# Patient Record
Sex: MALE
[De-identification: names, ages, dates, MRNs, and addresses within clinical notes are randomized per-mention and may not be internally consistent; named-entity substitution may affect disease eponyms.]

---

## 2019-09-06 LAB
BASOPHILS # BLD AUTO: 0.1 10*3/UL (ref 0–0.1)
BASOPHILS NFR BLD AUTO: 1.5 % (ref 0–1)
DEPRECATED NEUTROPHILS # BLD AUTO: 3 10*3/UL (ref 2.1–6.9)
EOSINOPHIL # BLD AUTO: 0.2 10*3/UL (ref 0–0.4)
EOSINOPHIL NFR BLD AUTO: 3.1 % (ref 0–6)
ERYTHROCYTE [DISTWIDTH] IN CORD BLOOD: 13.4 % (ref 11.7–14.4)
HCT VFR BLD AUTO: 40 % (ref 38.2–49.6)
HGB BLD-MCNC: 12.8 G/DL (ref 14–18)
LYMPHOCYTES # BLD: 2.2 10*3/UL (ref 1–3.2)
LYMPHOCYTES NFR BLD AUTO: 35.4 % (ref 18–39.1)
MCH RBC QN AUTO: 31.1 PG (ref 28–32)
MCHC RBC AUTO-ENTMCNC: 32 G/DL (ref 31–35)
MCV RBC AUTO: 97.3 FL (ref 81–99)
MONOCYTES # BLD AUTO: 0.6 10*3/UL (ref 0.2–0.8)
MONOCYTES NFR BLD AUTO: 10.4 % (ref 4.4–11.3)
NEUTS SEG NFR BLD AUTO: 49.3 % (ref 38.7–80)
PLATELET # BLD AUTO: 170 X10E3/UL (ref 140–360)
RBC # BLD AUTO: 4.11 X10E6/UL (ref 4.3–5.7)

## 2019-09-10 ENCOUNTER — HOSPITAL ENCOUNTER (OUTPATIENT)
Dept: HOSPITAL 88 - OR | Age: 69
Discharge: HOME | End: 2019-09-10
Attending: INTERNAL MEDICINE
Payer: MEDICARE

## 2019-09-10 VITALS — SYSTOLIC BLOOD PRESSURE: 111 MMHG | DIASTOLIC BLOOD PRESSURE: 76 MMHG

## 2019-09-10 DIAGNOSIS — K21.0: ICD-10-CM

## 2019-09-10 DIAGNOSIS — K44.9: ICD-10-CM

## 2019-09-10 DIAGNOSIS — G47.30: ICD-10-CM

## 2019-09-10 DIAGNOSIS — K22.70: Primary | ICD-10-CM

## 2019-09-10 DIAGNOSIS — I48.91: ICD-10-CM

## 2019-09-10 DIAGNOSIS — Z79.02: ICD-10-CM

## 2019-09-10 DIAGNOSIS — Z98.84: ICD-10-CM

## 2019-09-10 DIAGNOSIS — Z87.891: ICD-10-CM

## 2019-09-10 DIAGNOSIS — Z01.812: ICD-10-CM

## 2019-09-10 DIAGNOSIS — E66.3: ICD-10-CM

## 2019-09-10 DIAGNOSIS — M19.90: ICD-10-CM

## 2019-09-10 DIAGNOSIS — Z71.3: ICD-10-CM

## 2019-09-10 DIAGNOSIS — E78.00: ICD-10-CM

## 2019-09-10 DIAGNOSIS — Z01.810: ICD-10-CM

## 2019-09-10 PROCEDURE — 36415 COLL VENOUS BLD VENIPUNCTURE: CPT

## 2019-09-10 PROCEDURE — 45378 DIAGNOSTIC COLONOSCOPY: CPT

## 2019-09-10 PROCEDURE — 93005 ELECTROCARDIOGRAM TRACING: CPT

## 2019-09-10 PROCEDURE — 85025 COMPLETE CBC W/AUTO DIFF WBC: CPT

## 2019-09-10 PROCEDURE — 88305 TISSUE EXAM BY PATHOLOGIST: CPT

## 2019-09-10 PROCEDURE — 43239 EGD BIOPSY SINGLE/MULTIPLE: CPT

## 2019-09-10 NOTE — XMS REPORT
Summary of Care: 18 - 18

                             Created on: 2061



RUY CHILDRESS MYRNA

External Reference #: [id number]

: 1950

Sex: Male



Demographics







                          Address                   [address] [place] [address] [place]  [address]

 

                          Home Phone                [phone]

 

                          Preferred Language        English

 

                          Marital Status            

 

                          Shinto Affiliation     Gnosticism

 

                          Race                      White

 

                                        Additional Race(s)  

 

                          Ethnic Group              Non-





Author







                          Author                    [facility]

 

                          Organization              [facility]

 

                          Address                   Unknown

 

                          Phone                     Unavailable







Care Team Providers







                    Care Team Member Name    Role                Phone

 

                    Bettina Leroy    PCP                 [phone]







Encounter





HQ Encntr_alias(FIN) [id number] Date(s): 18 - 18

[facility] [address] [facility] [address] [place] [address]-  
  [phone]

Discharge Disposition: Home or Self Care

Attending Physician: Kuldeep Mckenzie MD

Referring Physician: Bettina Rueda DO





Vital Signs





No data available for this section



Problem List







    



              Condition     Effective Dates     Status       Health Status     Informant

 

    



                           Atrial                    Active  



                                         fibrillation(Confirm    



                                         ed)    

 

    



                           Tanner's                 Active  



                                         esophagus(Confirmed)    

 

    



                           Hard of                   Active  



                                         hearing(Confirmed)    

 

    



                           Sleep                     Active  



                                         apnea(Confirmed)    







Allergies, Adverse Reactions, Alerts





No Known Medication Allergies





   



                 Substance       Reaction        Severity        Status

 

   



                           NKFA                      Active







Medications





No data available for this section



Results





No data available for this section



Immunizations





No data available for this section



Procedures







    



              Procedure     Date         Related Diagnosis     Body Site     Status

 

    



                     Laparoscopic repair of inguinal hernia     18            Completed

 

    



                     Gastric bypass                      Completed

 

    



                     Operation                           Completed

 

    



                     Implantation of electronic stimulator of                     Completed



                                         spine    

 

    



                     TKR -Total prosthetic replacement of knee                     Completed



                                         joint using cement    

 

    



                           Esophagogastroduodenoscopy        Completed







Social History







 



                           Social History Type       Response

 

 



                           Substance Abuse           Use: None.

 

 



                           Sexual                    Sexually active: No.  Testicular Self Exam No.

 

 



                           Employment/School         Status: Retired.

 

 



                           Alcohol                   Never

 

 



                           Smoking Status            Current every day smoker; Type: Cigarettes; Concerns about tobacco

 use in



                                         household: Yes; Lives with someone who smokes; Cigarette Smoking Last 365



                                         Days Yes; Reg Smoking Cessation Counseling No



                                         entered on: 19







Assessment and Plan





No data available for this section

## 2019-09-10 NOTE — XMS REPORT
Summary of Care: 19 - 19

                             Created on: 2020



RUY CHILDRESS MYRNA

External Reference #: [id number]

: 1950

Sex: Male



Demographics







                          Address                   [address] [place] [address] [place]  [address]-

 

                          Home Phone                [phone]

 

                          Preferred Language        English

 

                          Marital Status            

 

                          Christianity Affiliation     Judaism

 

                          Race                      White

 

                                        Additional Race(s)  

 

                          Ethnic Group              Non-





Author







                          Author                    [facility]

 

                          Organization              [facility]

 

                          Address                   Unknown

 

                          Phone                     Unavailable







Care Team Providers







                    Care Team Member Name    Role                Phone

 

                    Bettina Leroy    PCP                 [phone]







Encounter





HQ Encntr_alias(FIN) [id number] Date(s): 19 - 19

[facility] [address] [place] [address]-     [phone]
[phone]

Discharge Disposition: Home or Self Care

Attending Physician: Dilshad Schuster MD

Referring Physician: Bettina Rueda DO





Vital Signs





No data available for this section



Problem List







    



              Condition     Effective Dates     Status       Health Status     Informant

 

    



                           Atrial                    Active  



                                         fibrillation(Confirm    



                                         ed)    

 

    



                           Tanner's                 Active  



                                         esophagus(Confirmed)    

 

    



                           Hard of                   Active  



                                         hearing(Confirmed)    

 

    



                           Sleep                     Active  



                                         apnea(Confirmed)    







Allergies, Adverse Reactions, Alerts





No Known Medication Allergies





   



                 Substance       Reaction        Severity        Status

 

   



                           NKFA                      Active







Medications





No data available for this section



Results





No data available for this section



Immunizations





No data available for this section



Procedures







    



              Procedure     Date         Related Diagnosis     Body Site     Status

 

    



                     Laparoscopic repair of inguinal hernia     18            Completed

 

    



                     Gastric bypass                      Completed

 

    



                     Operation                           Completed

 

    



                     Implantation of electronic stimulator of                     Completed



                                         spine    

 

    



                     TKR -Total prosthetic replacement of knee                     Completed



                                         joint using cement    

 

    



                           Esophagogastroduodenoscopy        Completed







Social History







 



                           Social History Type       Response

 

 



                           Substance Abuse           Use: None.

 

 



                           Sexual                    Sexually active: No.  Testicular Self Exam No.

 

 



                           Employment/School         Status: Retired.

 

 



                           Alcohol                   Never

 

 



                           Smoking Status            Current every day smoker; Type: Cigarettes; Concerns about tobacco

 use in



                                         household: Yes; Lives with someone who smokes; Cigarette Smoking Last 365



                                         Days Yes; Reg Smoking Cessation Counseling No



                                         entered on: 19







Assessment and Plan





No data available for this section

## 2019-09-10 NOTE — XMS REPORT
[facility]

                             Created on: 2018



Agustin Morton

External Reference #: [id number]

: 1950

Sex: Male



Demographics







                          Address                   [address] [place] [address] [place]  [address]

 

                          Home Phone                [phone]

 

                          Preferred Language        Unknown

 

                          Marital Status            Unknown

 

                          Gnosticist Affiliation     Unknown

 

                          Race                      Unknown

 

                          Ethnic Group              UNK





Author







                          Author                    Bettina Leroy

 

                          [facility]              [employer]

 

                          Address                   Unknown

 

                          Phone                     Unavailable







Care Team Providers







                    Care Team Member Name    Role                Phone

 

                    Bettina Leroy    CP                  Unavailable



                                                                



Allergies, Adverse Reactions, Alerts

          





                    Substance           Reaction            Event Type

 

                    N.K.D.A.            Info Not Available    Non Drug Allergy



                                                                                
       



Problems

          





             Problem Type    Condition    Code         Onset Dates    Condition Status

 

             Problem      Diverticulosis of colon    K57.30                    Active

 

             Problem      Hearing loss    H91.90                    Active

 

             Problem      Depressive disorder, not elsewhere classified    F32.9                     Active

 

             Problem      Prediabetes    R73.09                    Active

 

             Problem      A-fib        I48.91                    Active

 

                Problem         Benign prostatic hyperplasia with lower urinary tract symptoms    N40.1           

                                        Active

 

             Problem      Insomnia     G47.00                    Active

 

             Problem      Obesity      E66.9                     Active

 

             Problem      Mixed hyperlipidemia    E78.2                     Active

 

             Problem      VALERIE (obstructive sleep apnea)    G47.33                    Active

 

                          Assessment                Unilateral inguinal hernia without obstruction or gangrene, recurrence

 not specified      K40.90                                  Active

 

             Assessment    Feeling of incomplete bladder emptying    R39.14                    Active

 

             Problem      Tanner's esophagus    K22.70                    Active

 

                    Assessment          Benign prostatic hyperplasia with lower urinary tract symptoms    N40.1

                                                    Active

 

             Problem      Essential hypertension    I10                       Active



                                                                                
                                                                                
                                                                  



Medications

          





        Medication    Code System    Code    Instructions    Start Date    End Date    Status    Dosage



 

        Multi For Him 50+    NDC     22961588285     Orally                     Active    not defined

 

        Omeprazole    NDC     27394379074    20 MG Orally Once a day                    Active    1 capsule

 

        Xarelto    NDC     53098401977    20 MG Orally Once a day                    Active    1 tablet with 

food

 

        Zoloft    NDC     91463694192    25 MG Orally Once a day                    Active    1 tablet

 

        Restasis    NDC     64985389858    0.05 % Ophthalmic Twice a day                    Active    1  into

 affected eye

 

          Trazodone HCl    NDC       49652486774    150 MG Orally Once a day    May 17, 2013              Active

                                        1/2 tablet at bedtime

 

        Sotalol HCl    NDC     61736575050    80 MG Orally every 12 hrs                    Active    1 tablet



 

        Carvedilol    NDC     72590717292    6.25 MG Orally Twice a day                    Active    1 tablet

 with food

 

        Lipitor    NDC     87848454328    10 MG Orally Once a day                    Active    1 tablet

 

        Lexapro    NDC     59326625043    10 mg Orally Once a day                    Active    1 tablet



                                                                                
                                                                                
                          



Vital Signs

          





                          Date/Time:                2018

 

                          BMI                       28.81 Index

 

                          Weight                    206.6 lbs

 

                          Height                    71 in

 

                          Cardiac Monitoring Heart Rate    55 /min

 

                          Blood Pressure Diastolic    68 mm Hg

 

                          Blood Pressure Systolic    118 mm Hg



                                                                              



Results

          No Known Results                                                      
             



Summary Purpose

          [employer] Submission

## 2019-09-10 NOTE — XMS REPORT
[facility]

                             Created on: 2017



Agustin Morton

External Reference #: [id number]

: 1950

Sex: Male



Demographics







                          Address                   [address] [place] [address] [place]  [address]

 

                          Home Phone                [phone]

 

                          Preferred Language        Unknown

 

                          Marital Status            Unknown

 

                          Buddhist Affiliation     Unknown

 

                          Race                      Unknown

 

                          Ethnic Group              Non-





Author







                          Author                    Bettina Leroy

 

                          [facility]              [employer]

 

                          Address                   Unknown

 

                          Phone                     Unavailable







Care Team Providers







                    Care Team Member Name    Role                Phone

 

                    Bettina Leroy                      Unavailable



                                            



Encounters

          





                    Encounter           Location            Date

 

                    CPAP FOLLOW UP      [facility]    2015

 

                          CUB Notes for CPAP machine    [facility]

                                        2015

 

                          New Appointment Request    [facility]

                                        2015

 

                    req bone density    [facility]    Aug

 07, 2015

 

                    Physical FBW        [facility]    May 21,

 2014

 

                    REFERRAL            [facility]    2016



 

                    F/U BW              [facility]    2014



 

                    New Refill Request    [facility]    2017

 

                    Night 2 Sleep Study    [facility]    

2015

 

                    physical exam       [facility]    Nov 10,

 2015

 

                    FOLLOW UP           [facility]    Oct 12, 2015



 

                          New Appointment Request    [facility]

                                        Oct 13, 2015



                                                                                
                                                                                
                                    



Problems

          





             Problem Type    Condition    ICD-9 Code    Onset Dates    Condition Status

 

             Problem      Essential hypertension    I10                       Active

 

             Problem      Obesity      E66.9                     Active

 

             Problem      Tanner's esophagus    K22.70                    Active

 

             Problem      Diverticulosis of colon    K57.30                    Active

 

             Problem      Prediabetes    R73.09                    Active

 

             Problem      Mixed hyperlipidemia    E78.2                     Active

 

             Problem      A-fib        I48.91                    Active

 

             Problem      Depressive disorder, not elsewhere classified    F32.9                     Active

 

             Problem      Hearing loss    H91.90                    Active

 

             Problem      VALERIE (obstructive sleep apnea)    G47.33                    Active

 

             Problem      Insomnia     G47.00                    Active



                                                                                
                                                                                
                          



Social History

          





                    Social History Element    Qualifiers          Date Reported

 

                    Last Colonoscopy:    .  2009

 

                    children            .  5                2016

 

                    Tobacco Use:        .  Are you a: never smoker    2016

 

                    Use of recreational / street drugs?    .  Answer: No       2016

 

                    Ethnicity           .  Status , Is english your primary language? Yes    2016



 

                    Do you have pets?    .  Status: Yes, Type: dog(s)    2016

 

                    Marital Status:     .  Misty    2016

 

                    Caffeine intake?    .  Status: Yes, What type: Coffee, 1 cup a day    2016

 

                    Do you exercise?    .  Answer: Yes      2016

 

                    Do you drink alcohol?    .  Status: No       2016

 

                    Occupation:         .  , retired    2016



                                                                                
                                                                                
      



Summary Purpose

          [employer] Submission

## 2019-09-10 NOTE — XMS REPORT
Summary of Care: 18 - 18

                             Created on: 2071



RUY CHILDRESS

External Reference #: [id number]

: 1950

Sex: Male



Demographics







                          Address                   [address] [place] [address] [place]  [address]-

 

                          Home Phone                [phone]

 

                          Preferred Language        English

 

                          Marital Status            

 

                          Taoist Affiliation     Yarsani

 

                          Race                      White

 

                                        Additional Race(s)  

 

                          Ethnic Group              Non-





Author







                          Author                    [facility]

 

                          Organization              [facility]

 

                          Address                   Unknown

 

                          Phone                     Unavailable







Care Team Providers







                    Care Team Member Name    Role                Phone

 

                    Bettina Leroy    PCP                 [phone]







Encounter





HQ Encntr_alias(FIN) [id number] Date(s): 18 - 18

[facility] [address] [place] [address]-     2
[phone]

Discharge Disposition: Home or Self Care

Attending Physician: Dilshad Schuster MD

Referring Physician: Bettina Rueda DO





Vital Signs





No data available for this section



Problem List







    



              Condition     Effective Dates     Status       Health Status     Informant

 

    



                           Atrial                    Active  



                                         fibrillation(Confirm    



                                         ed)    

 

    



                           Tanner's                 Active  



                                         esophagus(Confirmed)    

 

    



                           Hard of                   Active  



                                         hearing(Confirmed)    

 

    



                           Sleep                     Active  



                                         apnea(Confirmed)    







Allergies, Adverse Reactions, Alerts





No Known Medication Allergies





   



                 Substance       Reaction        Severity        Status

 

   



                           NKFA                      Active







Medications





No data available for this section



Results





No data available for this section



Immunizations





No data available for this section



Procedures







    



              Procedure     Date         Related Diagnosis     Body Site     Status

 

    



                     Laparoscopic repair of inguinal hernia     18            Completed

 

    



                     Complex uroflowmetry (eg, calibrated     18             Completed



                                         electronic equipment)    

 

    



                     Measurement of post-voiding residual urine     18             Completed



                                         and/or bladder capacity by ultrasound,    



                                         non-imaging    

 

    



                     Gastric bypass                      Completed

 

    



                     Operation                           Completed

 

    



                     Implantation of electronic stimulator of                     Completed



                                         spine    

 

    



                     TKR -Total prosthetic replacement of knee                     Completed



                                         joint using cement    

 

    



                           Esophagogastroduodenoscopy        Completed







Social History







 



                           Social History Type       Response

 

 



                           Substance Abuse           Use: None.

 

 



                           Sexual                    Sexually active: No.  Testicular Self Exam No.

 

 



                           Employment/School         Status: Retired.

 

 



                           Alcohol                   Never

 

 



                           Smoking Status            Current every day smoker; Type: Cigarettes; Concerns about tobacco

 use in



                                         household: Yes; Lives with someone who smokes; Cigarette Smoking Last 365



                                         Days Yes; Reg Smoking Cessation Counseling No



                                         entered on: 19







Assessment and Plan





No data available for this section

## 2019-09-10 NOTE — OPERATIVE REPORT
DATE OF PROCEDURE:  

 

SURGEON:  Robert Beltre MD

 

NAME OF THE PROCEDURE:  EGD and colonoscopy.

 

PREPROCEDURE DIAGNOSES:  The patient with history of Tanner's and colon cancer

screening. 

 

DESCRIPTION OF PROCEDURE:  After informed and written consent, premedications with

monitored anesthesia care, standard video gastroscope was introduced into the mouth,

esophagus, and stomach into the second portion into the jejunum.  The gastrojejunal

anastomosis appeared to be normal.  The patient has had a gastric bypass.  A complete

Z-line was noted, indicative of a small hiatal hernia.  There was irregularity of the GE

junction and the Z-line, and biopsies were done to look for dysplasia in the setting of

short-segment Tanner's.  Distal esophagus was normal.  Next, adult video Olympus

colonoscope was introduced into the rectum and all the way into the terminal ileum.

Terminal ileum, cecum, ascending, transverse, descending, sigmoid, and rectum all

appeared to be normal. 

 

IMPRESSION:  Normal colonoscopy, hiatal hernia, history of Tanner's.

 

RECOMMENDATIONS:  EGD in 5 years.  Follow up in the office in 2 to 3 weeks.  GERD

precautions have been advised. 

 

 

 

 

______________________________

Robert Beltre MD

 

SR/MODL

D:  09/10/2019 08:07:18

T:  09/10/2019 14:37:38

Job #:  [id number]

 

cc:            Bettina Rueda DO

## 2019-09-10 NOTE — XMS REPORT
Summary of Care: 18 - 18

                             Created on: 2045



RUY CHILDRESS MYRNA

External Reference #: [id number]

: 1950

Sex: Male



Demographics







                          Address                   [address] [place]  [address]-

 

                          Home Phone                [phone]

 

                          Preferred Language        English

 

                          Marital Status            

 

                          Tenriism Affiliation     Mandaeism

 

                          Race                      White

 

                                        Additional Race(s)  

 

                          Ethnic Group              Non-





Author







                          Author                    [facility]

 

                          Organization              [facility]

 

                          Address                   Unknown

 

                          Phone                     Unavailable







Care Team Providers







                    Care Team Member Name    Role                Phone

 

                    Bettina Leroy    PCP                 [phone]







Encounter





HQ Chanel(FIN) [id number] Date(s): 18 - 18

[facility] [address] [place] [address]-     [phone]
[phone]

Encounter Diagnosis

Gastro-esophageal reflux disease without esophagitis (Final) - 

Hyperlipidemia, unspecified (Final) - 

Unspecified atrial fibrillation (Final) - 

Unspecified right bundle-branch block (Final) - 

Long term (current) use of anticoagulants (Final) - 

Bilateral inguinal hernia, without obstruction or gangrene, not specified as rec
urrent (Final) - 19

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 

Bariatric surgery status (Final) - 

Atherosclerotic heart disease of native coronary artery without angina pectoris 
(Final) - 

Discharge Disposition: Home or Self Care

Attending Physician: Kuldeep Mckenzie MD

Admitting Physician: Kuldeep Mckenzie MD

Referring Physician: Kuldeep Mckenzie MD





Vital Signs







                    1                   2                   3



                                         Most recent to   



                                         oldest [Reference   



                                         Range]:   

 

                                        180.34 cm 

                    (18 7:48 AM)                         



                                         Height   

 

                                        97.7 DegF 

                    (18 7:49 AM)                         



                                         Temperature Oral   



                                         [96.4-99.1 DegF]   

 

                                        102/57 mmHg 

                          (18 11:45 AM)       94/70 mmHg 

                          (18 11:30 AM)       93/66 mmHg 

                                        (18 11:15 AM)



                                         Blood Pressure   



                                         [/60-90 mmHg]   

 

                                        16 BRMIN 

                          (18 10:20 AM)       16 BRMIN 

                          (18 10:15 AM)       16 BRMIN 

                                        (18 10:00 AM)



                                         Respiratory Rate   



                                         [14-20 BRMIN]   

 

                                        58 bpm 

*LOW*

                    (18 7:49 AM)                         



                                         Peripheral Pulse   



                                         Rate [ bpm]   

 

                                        90.909 kg 

                    (18 7:48 AM)                         



                                         Weight   

 

                                        27.95 m2 

                    (18 7:48 AM)                         



                                         Body Mass Index   







Problem List







    



              Condition     Effective Dates     Status       Health Status     Informant

 

    



                           Atrial                    Active  



                                         fibrillation(Confirm    



                                         ed)    

 

    



                           Tanner's                 Active  



                                         esophagus(Confirmed)    

 

    



                           Hard of                   Active  



                                         hearing(Confirmed)    

 

    



                           Sleep                     Active  



                                         apnea(Confirmed)    







Allergies, Adverse Reactions, Alerts





No Known Medication Allergies





   



                 Substance       Reaction        Severity        Status

 

   



                           NKFA                      Active







Medications





Ancef + sterile water 20 mL

2 gm, Route: IVP, ONCE, Dosing Weight 90.909, kg, Start date: 18 8:17:00 C
ST, Stop date: 18 8:17:00 CST, Surgical Prophylaxis Only; For patients < 
120 kg, ABX Indication: Surgical Prophylaxis

Notes: (Same As: Ancef, Kefzol)  *** MEDICATION WASTE ***Product Size:  1000 mgP
roduct Wasted:  ___ mg

Start Date: 18

Stop Date: 1/10/19

Status: Discontinued



ceFAZolin (ANES)

Route: IV, Drug form: INJ, ONCE, Stop date: 18 9:13:00 CST

Start Date: 18

Stop Date: 18

Status: Completed



Colace 100 mg oral capsule

100 mg=1 cap, PO, BID, PRN Constipation, # 20 cap, 0 Refill(s)

Start Date: 18

Status: Ordered



dexamethasone (ANES)

Route: IV, Drug form: INJ, ONCE, Stop date: 18 9:43:00 CST

Start Date: 18

Stop Date: 18

Status: Completed



ePHEDrine (ANES)

Route: IV, Drug form: INJ, ONCE, Stop date: 18 9:43:00 CST

Start Date: 18

Stop Date: 18

Status: Completed



fentaNYL (ANES)

Route: IV, Drug form: INJ, ONCE, Stop date: 18 9:13:00 CST

Start Date: 18

Stop Date: 18

Status: Completed



glycopyrrolate (ANES)

Route: IV, Drug form: INJ, ONCE, Stop date: 18 9:43:00 CST

Start Date: 18

Stop Date: 18

Status: Completed



Hair, Skin & Nails

5 mg=, PO, Daily, 0 Refill(s)

Start Date: 18

Status: Ordered



Lactated Ringers Injection IV (ANES) 1000 mL

Route: IV, Total Volume: 1,000, Start date: 18 8:23:00 CST, Stop date:  9:23:00 CST

Start Date: 18

Stop Date: 18

Status: Completed



Lactated Ringers Injection IV 1000 mL

1,000 mL, Rate: 25 ml/hr, Infuse over: 40 hr, Route: IV, Dosing Weight 90.909 kg
, Total Volume: 1,000, Start date: 18 8:15:00 CST, Duration: 30 day, Stop 
date: 19 8:14:00 CST, 2.15, m2

Start Date: 18

Stop Date: 18

Status: Discontinued



lidocaine (ANES)

Route: IV, Drug form: INJ, ONCE, Stop date: 18 9:13:00 CST

Start Date: 18

Stop Date: 18

Status: Completed



midazolam (ANES)

Route: IV, Drug form: SOLN, ONCE, Stop date: 18 9:13:00 CST

Start Date: 18

Stop Date: 18

Status: Completed



neostigmine (ANES)

Route: IV, Drug form: INJ, ONCE, Stop date: 18 9:43:00 CST

Start Date: 18

Stop Date: 18

Status: Completed



ondansetron (ANES)

Route: IV, Drug form: INJ, ONCE, Stop date: 18 9:43:00 CST

Start Date: 18

Stop Date: 18

Status: Completed



propofol (ANES)

Route: IV, Drug form: INJ, ONCE, Stop date: 18 9:13:00 CST

Start Date: 18

Stop Date: 18

Status: Completed



rocuronium (ANES)

Route: IV, Drug form: INJ, ONCE, Stop date: 18 9:13:00 CST

Start Date: 18

Stop Date: 18

Status: Completed



ropivacaine 0.2% in NS - site 1 400 mL

Dosing: Per Nerve Block Dosing Order, Route: NERVE BLOCK, Start date: 18 9
:17:00  mL, Drug Form: INJ, Dosing Weight 90.909, kg, Duration: 30 day, S
top date: 19 9:16:00 CST

Notes: Final concentration:  Ropivacaine 0.2% 400 ml

Start Date: 18

Stop Date: 18

Status: Discontinued



ropivacaine 0.2% in NS - site 1 400 mL

Dosing: Per Nerve Block Dosing Order, Route: NERVE BLOCK, Start date: 18 9
:16:00  mL, Dosing Weight 90.909, kg, Duration: 30 day, Stop date:  9:15:00 CST

Start Date: 18

Stop Date: 18

Status: Deleted



ropivacaine 0.2% in NS - site 2 400 mL

Dosing: Per Nerve Block Dosing Order, Route: NERVE BLOCK, Start date: 18 9
:17:00  mL, Drug Form: INJ, Dosing Weight 90.909, kg, Duration: 30 day, S
top date: 19 9:16:00 CST

Notes: Final concentration:  Ropivacaine 0.2% 400 ml

Start Date: 18

Stop Date: 18

Status: Discontinued



ropivacaine 0.2% in NS - site 2 400 mL

Dosing: Per Nerve Block Dosing Order, Route: NERVE BLOCK, Start date: 18 9
:16:00  mL, Dosing Weight 90.909, kg, Duration: 30 day, Stop date:  9:15:00 CST

Start Date: 18

Stop Date: 18

Status: Deleted



Tylenol with Codeine #3 oral tablet

1 tab, PO, Q6H, PRN pain, X 7 day, # 28 tab, 0 Refill(s)

Start Date: 18

Stop Date: 1/7/19

Status: Completed



Results











 



                           Most recent to            1



                                         oldest [Reference 



                                         Range]: 

 

 



                           Neutrophils #             3.2 K/CMM



                           [1.5-8.1 K/CMM]           (18 8:19 AM)

 

 



                           Lymphocytes #             2.0 K/CMM



                           [1.0-5.5 K/CMM]           (18 8:19 AM)

 

 



                           Monocytes # [0.0-0.8      0.6 K/CMM



                           K/CMM]                    (18 8: AM)

 

 



                           Eosinophils #             0.2 K/CMM



                           [0.0-0.5 K/CMM]           (18 8: AM)

 

 



                           Basophils # [0.0-0.2      0.1 K/CMM



                           K/CMM]                    (18 8: AM)

 

 



                           eGFR                      88 mL/min/1.73m2 1



                                         *NA*



                                         (18 AM)

 

 



                           A/G Ratio [0.7-1.6]       1.0



                                         (18: AM)

 

 



                           Albumin Lvl [3.5-5.0      3.5 g/dL



                           g/dL]                     (18 8: AM)

 

 



                           Alk Phos [          68 unit/L



                           unit/L]                   (18 AM)

 

 



                           ALT [0-65 unit/L]         44 unit/L



                                         (18 AM)

 

 



                           AGAP [10.0-20.0           10.2 mEq/L



                           mEq/L]                    (18 AM)

 

 



                           AST [0-37 unit/L]         30 unit/L



                                         (18: AM)

 

 



                           B/C Ratio [6-25]          24



                                         (18 8: AM)

 

 



                           Basophils [0.0-1.0        0.9 %



                           %]                        (18 AM)

 

 



                           BUN [7-22 mg/dL]          20 mg/dL



                                         (18 8 AM)

 

 



                           Calcium Lvl               8.5 mg/dL



                           [8.5-10.5 mg/dL]          (18 8: AM)

 

 



                           Chloride Lvl [      109 mEq/L



                           mEq/L]                    (18 AM)

 

 



                           CO2 [24-32 mEq/L]         28 mEq/L



                                         (18)

 

 



                           Creatinine Lvl            0.85 mg/dL



                           [0.50-1.40 mg/dL]         (18 AM)

 

 



                           Eosinophils [0.0-4.0      3.9 %



                           %]                        (18)

 

 



                           Globulin [2.7-4.2         3.5 g/dL



                           g/dL]                     (18)

 

 



                           Glucose Lvl [70-99        74 mg/dL



                           mg/dL]                    (18 AM)

 

 



                           Hct [42.0-54.0 %]         41.0 %



                                         *LOW*



                                         (18)

 

 



                           Hgb [14.0-18.0 g/dL]      13.9 g/dL



                                         *LOW*



                                         (18)

 

 



                           Potassium Lvl             4.2 mEq/L



                           [3.5-5.1 mEq/L]           (18)

 

 



                           Lymphocytes               32.7 %



                           [20.0-40.0 %]             (18)

 

 



                           MCH [27.0-31.0 pg]        32.4 pg



                                         *HI*



                                         (18)

 

 



                           MCHC [32.0-36.0           33.8 g/dL



                           g/dL]                     (18 AM)

 

 



                           MCV [80.0-94.0 fL]        95.9 fL



                                         *HI*



                                         (18)

 

 



                           Monocytes [2.0-12.0       10.2 %



                           %]                        (18)

 

 



                           MPV [7.4-10.4 fL]         8.4 fL



                                         (18 AM)

 

 



                           Sodium Lvl [135-145       143 mEq/L



                           mEq/L]                    (18)

 

 



                           Platelet [133-450         165 K/CMM



                           K/CMM]                    (18 AM)

 

 



                           Segs [45.0-75.0 %]        52.3 %



                                         (18)

 

 



                           Total Protein             7.0 g/dL



                           [6.4-8.4 g/dL]            (18 8:19 AM)

 

 



                           RBC [4.70-6.10            4.28 M/CMM



                           M/CMM]                    *LOW*



                                         (18 8:19 AM)

 

 



                           RDW [11.5-14.5 %]         13.6 %



                                         (18 8:19 AM)

 

 



                           Bili Total [0.2-1.3       0.7 mg/dL



                           mg/dL]                    (18 8:19 AM)

 

 



                           WBC [3.7-10.4 K/CMM]      6.1 K/CMM



                                         (18 8:19 AM)







1Result Comment: The eGFR is calculated using the CKD-EPI formula. In most 
young, healthy individuals the eGFR will be >90 mL/min/1.73m2. The eGFR declines
with age. An eGFR of 60-89 may be normal in some populations, particularly the 
elderly, for whom the CKD-EPI formula has not been extensively validated. Use of
the eGFR is not recommended in the following populations:



Individuals with unstable creatinine concentrations, including pregnant patients
and those with serious co-morbid conditions.



Patients with extremes in muscle mass or diet. 



The data above are obtained from the National Kidney Disease Education Program (
NKDEP) which additionally recommends that when the eGFR is used in patients with
extremes of body mass index for purposes of drug dosing, the eGFR should be mul
tiplied by the estimated BMI.



Immunizations





No data available for this section



Procedures







    



              Procedure     Date         Related Diagnosis     Body Site     Status

 

    



                     Laparoscopic repair of inguinal hernia     18            Completed

 

    



                     Gastric bypass                      Completed

 

    



                     Operation                           Completed

 

    



                     Implantation of electronic stimulator of                     Completed



                                         spine    

 

    



                     TKR -Total prosthetic replacement of knee                     Completed



                                         joint using cement    

 

    



                           Esophagogastroduodenoscopy        Completed







Social History







 



                           Social History Type       Response

 

 



                           Substance Abuse           Use: None.

 

 



                           Sexual                    Sexually active: No.  Testicular Self Exam No.

 

 



                           Employment/School         Status: Retired.

 

 



                           Alcohol                   Never

 

 



                           Smoking Status            Current every day smoker; Type: Cigarettes; Concerns about tobacco

 use in



                                         household: Yes; Lives with someone who smokes; Cigarette Smoking Last 365



                                         Days Yes; Reg Smoking Cessation Counseling No



                                         entered on: 19







Assessment and Plan

Extracted from:





  



                     Title: Clinical Document     Author: Kuldeep Mckenzie MD     Date: 18









                                        DATE OF OPERATION/PROCEDURE: 2018



*_*_*

PREOPERATIVE DIAGNOSIS:

Right inguinal hernia.



POSTOPERATIVE DIAGNOSES:

Bilateral inguinal hernia.



PROCEDURES PERFORMED:

                                        1.  Bilateral laparoscopic inguinal hernia repair with mesh.

                                        2.  Reduction of left-sided cord lipoma.

                                        3.  Placement of bilateral On-Q pain catheters.



SURGEON:

Kuldeep Mckenzie M.D.



ANESTHESIA:

General endotracheal.



WOUND CLASSIFICATION:

Clean.



OPERATIVE TIME:

                                        1 hour



ESTIMATED BLOOD LOSS:

Minimal.



FLUID REPLACEMENT:

Per anesthesia.



SPECIMENS SENT FOR PATHOLOGY:

None.



COMPLICATIONS:

None.



FINDINGS:

                                        1.  Right inguinal hernia containing hernia sac in the indirect space.

                                        2.  Left inguinal hernia containing cord lipoma in the indirect space.



CONDITION:

To recovery, stable.



POSTOPERATIVE PLAN:

Discharge to home.



INDICATIONS:

This is a 68-year-old gentleman that presented to my clinic with right groin 
pain.  His history and physical as well as laboratory studies were consistent 
with diagnosis of a right inguinal hernia.  Therefore, the option of a 
laparoscopic right versus bilateral inguinal hernia repair with mesh was offered
to the patient.  Risks and benefits of the procedure were explained and the 
patient voiced understanding and consented to the procedure.



OPERATIVE NARRATIVE:

Patient was taken to the operating room and placed on the operating table and 
intubated by anesthesia.  The abdomen, groin, testicles, and penis were prepped 
and draped in the usual sterile fashion.  A timeout was called and the correct 
patient, as well as procedure was verified.  The patient had SCDs on for 
thromboembolic prophylaxis and received antibiotics within 1 hour of the 
incision.



Attention was turned to the right infraumbilical region.  Marcaine 0.5% was 
infused followed by a 1-cm incision carried down using electrocautery and the 
fascia was identified, scored using a 15-blade and the rectus muscles swept 
laterally and the space maker plus device was placed in the preperitoneal space,
the preperitoneal space was dissected using 30 pumps of this space maker 
balloon. The space maker was removed and CO2 gas was applied and two 0.5 cm 
trocars were introduced under direct visualization in the midline in the 
suprapubic region.  At this time, attention was turned to the right inguinal 
space.  It was dissected as far lateral as the psoas muscle, as far medial as 
the pubic symphysis, as far superior as the level of umbilicus, and as far 
posterior as the femoral vessels. The spermatic cord and structures were 
isolated and we identified the hernia sac.  The hernia sac was dissected back 
into the preperitoneal space and freed from the cord structures with a 
combination of sharp as well as blunt dissection.  At this time, we turned our 
attention to the left side, the left inguinal space was dissected as far lateral
as the psoas muscle, as far medial as the pubic symphysis, as far superior as 
the umbilicus, and as far posterior as the femoral vessels. Again, we isolated 
the cord and structures and inspected the indirect space and this time, we noted
a cord lipoma in the indirect space and it was readily dissected back into the 
preperitoneal space and noted that it was herniating through a defect in the 
indirect space constituting another inguinal hernia; therefore, at this time, it
was determined that the patient would benefit from bilateral repairs.



At this time, the left side Parietex anatomic mesh was placed.  It was tacked 
medially into the pubic symphysis, laterally above the iliopubic tract to avoid 
the genital femoral nerve, and inferior lip of the mesh was wrapped around the 
cord and structures to recreate the internal ring.  Attention was turned to the 
right side.  Again, a Parietex anatomic mesh was placed.  It was tacked medially
into the pubic symphysis, laterally above the iliopubic tract to avoid the 
genital femoral nerve and the inferior lip of the mesh was wrapped around the 
cord structures to recreate the internal ring. At this time, bilateral On-Q pain
catheter sheaths were introduced under direct visualization into the aponeurosis
of the internal oblique laterally and directed into the rectus muscle medially 
under direct visualization.  The introducer needles were removed leaving the 
catheter sheath. At this time, the pneumoperitoneum was decompressed under 
direct visualization and the mesh was observed to lay flat within the overlaps 
or folds.  The fascia in the umbilical port was closed using 0 Vicryl in a 
figure-of-eight manner and the skin was closed using 4-0 Monocryl in running 
subcuticular manner.  The skin and two 5-mm ports were closed using 4-0 Monocryl
in simple U-stitch manner.  The On-Q pain catheters were placed in the catheter 
sheath and the fascia and the sheath was primed using 5 mL of 0.5% Marcaine.  
The patient was then extubated in the operating room and transferred to recovery
in stable condition.  All instrument and laparotomy counts were correct.





Extracted from:





  



                     Title: Clinical Document     Author: Kuldeep Mckenzie MD     Date: 18









                                        SURGICAL HISTORY AND PHYSICAL



Attending: Kuldeep Mckenzie MDPhone: [phone]: Plastic Surgery 
Service

Code status: None Specified=FULL CODE

Reason for Admission: LAP HERNIA REPAIR

Working DRG: 

Isolation: None Documented

Consulting Physicians: 

Kuldeep Mckenzie MDOffice: [phone]: General Surgery



CHIEF COMPLAINT: Right inguinal hernia.



HISTORY OF PRESENT ILLNESS:

                                        68-year-old gentleman comes for elective repair of the right knee is previously 

evaluated in clinic bulge on exam was consistent with right inguinal hernia.  He
appears to be symptomatic with inguinal hernia that has been bothering him now 
for at least several months.  He walks regularly, 7 miles per day, notices right
groin discomfort described as mild to moderate aching soreness after his walks. 
Pain is improved at rest and worse with exertion.  No fevers or chills, no 
nausea vomiting.



PAST MEDICAL HISTORY:

                                        1.  Coronary artery disease.

                                        2.  Reflux.

                                        3.  Dyslipidemia.



PAST SURGICAL HISTORY:

                                        1.  Gastric bypass.

                                        2.  Total knee replacement.

                                        3.  Spinal stimulator implant.



MEDS: Please refer to the medication reconciliation form.



Allergies: NKFA, NKDA





SOCIAL HISTORY:

Smokes, denies drinking or illicit drugs



FAMILY HISTORY: Noncontributory



RVIEW OF SYSTEMS: Other than what was mentioned in the HPI, complete review of 
systems were performed and are negative.



VitalsTmp(F)[id number]

                                        (no data in last 48 hours)





                                        24 Hr Tmax: No Data AvailableVital Signs are the last 5 in the past 48 hours.



General Appearance: Well appearing, well developed, well nourished, well 
hydrated, good color, and in no acute distress

Head: Normocephalic atraumatic

Eyes: Pupils equal/round/reactive to light, no scleral icterus, extraocular 
movements intact

Ears: Normal external shape, normal position

Nose: Nares patent and no discharge

Neck: Supple, FROM

Chest Wall: No retractions, No deformities

Lungs: CTA bilaterally, and good air entry

Heart: Regular rate and regular rhythm

Abdomen: soft, non-tender, non-distended, no HSM, and no mass

: Right inguinal hernia noted soft, reducible, mildly tender

Musculoskeletal: No obvious deformity. Moves all 4 extremities, stable gait.

Extremities: Symmetric, no obvious defect, and no cyanosis/clubbing/edema. 2+ 
pulses bilaterally.

Neurologic: Alert/appropriate. CN II-XII grossly intact. Clear speech, aao x 3.

Psych: Mood congruent affect, responds appropriately to questions.



Labs (Last four charted values)

WBC                 6.1(DEC 21)

Hgb                 L 13.9(DEC 21)

Hct                 L 41.0(DEC 21)

Plt                 165(DEC 21)

Na                  143(DEC 21)

K                   4.2(DEC 21)

CO2                 28(DEC 21)

Cl                  109(DEC 21)

Cr                  0.85(DEC 21)

BUN                 20(DEC 21)

Glucose Random      74(DEC 21)

Ca                  8.5(DEC 21)



Alk Phos: 68 unit/L (18 08:19:00)

A/G Ratio: 1 (18 08::00)

ALT: 44 unit/L (18 08:19:00)

Albumin Lvl: 3.5 g/dL (18 08:19:00)

Bili Total: 0.7 mg/dL (18 08:19:00)

Total Protein: 7 g/dL (18 08:19:00)

Globulin: 3.5 g/dL (18 08:19:00)



No qualifying data available.



DIAGNOSTIC:

None



ASSESSMENT:

                                        68-year-old gentleman with:

                                        1.  Right inguinal hernia.

                                        2.  Coronary artery disease on Xarelto.

                                        3.  Reflux.

                                        4.  Dyslipidemia.



PLAN:

                                        1.  Patient will benefit from laparoscopic divergent bilaterally.

                                        2.  Recent benefits discussed with patient in detail and he understands.

                                        3.  Cardiac clearance is taken, Xarelto has been held since Friday.

                                        4.  Impression and plan discussed with the patient, questions answered and consents.

## 2019-09-10 NOTE — XMS REPORT
[facility]

                             Created on: 2015



Agustin Morton

External Reference #: [id number]

: 1950

Sex: Male



Demographics







                          Address                   [address] [place] [address] [place]  [address]

 

                          Home Phone                [phone]

 

                          Preferred Language        Unknown

 

                          Marital Status            Unknown

 

                          Church Affiliation     Unknown

 

                          Race                      Unknown

 

                          Ethnic Group              Non-





Author







                          Author                    Clayton Ramirez

 

                          Organization              [employer]

 

                          Address                   Unknown

 

                          Phone                     Unavailable







Care Team Providers







                    Care Team Member Name    Role                Phone

 

                    Clayton Ramirez     CP                  Unavailable



                                                                



Allergies, Adverse Reactions, Alerts

          





                    Substance           Reaction            Event Type

 

                    N.K.D.A.            Info Not Available    Non Drug Allergy



                                                                    



Encounters

          





                    Encounter           Location            Date

 

                    CPAP FOLLOW UP      [facility]    2015

 

                          CUB Notes for CPAP machine    [facility]

                                        2015

 

                          New Appointment Request    [facility]

                                        2015

 

                    req bone density    [facility]    Aug

 07, 2015

 

                    Physical FBW        [facility]    May 21,

 2014

 

                    F/U BW              [facility]    2014



 

                    Night 2 Sleep Study    [facility]    

2015

 

                    physical exam       [facility]    Nov 10,

 2015

 

                    FOLLOW UP           [facility]    Oct 12, 2015



 

                          New Appointment Request    [facility]

                                        Oct 13, 2015



                                                                                
                                                                                
                



Problems

          





             Problem Type    Condition    ICD-9 Code    Onset Dates    Condition Status

 

             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

 

             Problem      Hearing loss    389.9                     Active

 

             Problem      Obesity      278.00                    Active

 

             Problem      Prediabetes    R73.09                    Active

 

             Problem      Mixed hyperlipidemia    E78.2                     Active

 

             Problem      A-fib        I48.91                    Active

 

             Problem      Depression    311                       Active

 

             Problem      Hyperlipidemia    272.4                     Active

 

             Problem      VALERIE (obstructive sleep apnea)    G47.33                    Active

 

             Problem      Insomnia     780.52                    Active

 

             Assessment    Prediabetes    R73.09                    Active

 

             Assessment    A-fib        I48.91                    Active

 

             Assessment    Mixed hyperlipidemia    E78.2                     Active

 

                          Assessment                Need for prophylactic vaccination with Streptococcus pneumoniae (Pneumococcus)

 and Influenza vaccines    Z23                                     Active

 

                    Assessment          Routine general medical examination at a health care facility    Z00.00

                                                    Active

 

             Assessment    Severe obesity (BMI >=40)    E66.01                    Active

 

             Problem      Diverticulosis    562.10                    Active

 

             Assessment    BMI 40.0-44.9, adult    Z68.41                    Active

 

             Problem      HTN (hypertension)    401.9                     Active



                                                                                
                                                                                
                                                                                
                         



Medications

          





        Medication    Code System    Code    Instructions    Start Date    End Date    Status    Dosage



 

        Multi For Him 50+    MEDISPAN    19485-01360     Orally                     Active    Unknown

 

        Restasis    MEDISPAN    [address]-73    0.05 % Ophthalmic Twice a day                    Active    

1  into affected eye

 

        Lexapro    [facility]    [address]-58    10 mg Orally Once a day                    Active    1 tablet



 

           Trazodone HCl    [facility]    [address]-44    150 MG Orally Once a day    May 17, 2013    

                          Active                    1/2 tablet at bedtime

 

        Xarelto    [facility]    [address]-69    20 MG Orally Once a day                    Active    1 tablet

 with food

 

        Zoloft    [facility]    [address]-43    50 MG Orally Once a day                    Active    1 tablet



 

        Carvedilol    [facility]    [address]-13    6.25 MG Orally Twice a day                    Active    1

 tablet with food

 

        Omeprazole    [facility]    [address]-48    20 MG Orally Once a day                    Active    1 capsule





                                                                                
                                                                             



Social History

          





                    Social History Element    Qualifiers          Date Reported

 

                    Last Colonoscopy:    .  2009             Nov 10, 2015

 

                    children            .  5                Nov 10, 2015

 

                    Tobacco Use:        .  Are you a: never smoker    Nov 10, 2015

 

                    Use of recreational / street drugs?    .  Answer: No       Nov 10, 2015

 

                    Ethnicity           .  Status , Is english your primary language? Yes    Nov 10, 2015



 

                    Do you have pets?    .  Status: Yes, Type: dog(s)    Nov 10, 2015

 

                    Marital Status:     .  Misty    Nov 10, 2015

 

                    Caffeine intake?    .  Status: Yes, What type: Coffee, 1 cup a day    Nov 10, 2015

 

                    Do you exercise?    .  Answer: Yes      Nov 10, 2015

 

                    Do you drink alcohol?    .  Status: No       Nov 10, 2015

 

                    Occupation:         .  , retired    Nov 10, 2015



                                                                                
                                                                                
                                    



Family history

          





                Qualifier       Description     Comment         Date Reported

 

                Maternal Grandmother            Comment not available    Nov 10, 2015

 

                Paternal Grandmother            Comment not available    Nov 10, 2015

 

                Siblings                        Comment not available    Nov 10, 2015

 

                Maternal Grandfather            heart disease    Nov 10, 2015

 

                Children        alive           Comment not available    Nov 10, 2015

 

                Father                  heart disease    Nov 10, 2015

 

                Paternal Grandfather            heart disease    Nov 10, 2015

 

                Mother                  Comment not available    Nov 10, 2015

 

                Other:                          Comment not available    Nov 10, 2015



                                                                                
                                                                                
                



Vital Signs

          





                          Date/Time:                Nov 10, 2015

 

                          Weight                    289 lbs

 

                          Height                    71 in

 

                          Blood Pressure Diastolic    70 mm Hg

 

                          Blood Pressure Systolic    108 mm Hg



                                                                    



Results

          





                                         

 

                                        HEMOCCULT CARD



                                                                    



Summary Purpose

          [employer] Submission

## 2019-09-10 NOTE — XMS REPORT
Summary of Care: 19 - 19

                             Created on: 2066



RUY CHILDRESS

External Reference #: [id number]

: 1950

Sex: Male



Demographics







                          Address                   [address] [place]  [address]

 

                          Home Phone                [phone]

 

                          Preferred Language        English

 

                          Marital Status            

 

                          Christianity Affiliation     Yarsani

 

                          Race                      White

 

                                        Additional Race(s)  

 

                          Ethnic Group              Non-





Author







                          Author                    [facility]

 

                          Organization              [facility]

 

                          Address                   Unknown

 

                          Phone                     Unavailable







Care Team Providers







                    Care Team Member Name    Role                Phone

 

                    Bettina Leroy    PCP                 [phone]







Encounter





HQ Boubacar_renae(FIN) [id number] Date(s): 19 - 19

[facility] [address] [place] [address]-  
  [phone]

Discharge Disposition: Home or Self Care

Attending Physician: Kuldeep Mckenzie MD

Referring Physician: Bettina Rueda DO





Vital Signs







 



                           Most recent to            1



                                         oldest [Reference 



                                         Range]: 

 

 



                           Height                    180.34 cm



                                         (19 11:17 AM)

 

 



                           Temperature Oral          97.5 DegF



                           [96.4-99.1 DegF]          (19 11:17 AM)

 

 



                           Blood Pressure            102/66 mmHg



                           [/60-90 mmHg]       (19 11:17 AM)

 

 



                           Peripheral Pulse          84 bpm



                           Rate [ bpm]         (19 11:17 AM)

 

 



                           Weight                    91.932 kg



                                         (19 11:17 AM)

 

 



                           Body Mass Index           28.27 m2



                                         (19 11:17 AM)







Problem List







    



              Condition     Effective Dates     Status       Health Status     Informant

 

    



                           Atrial                    Active  



                                         fibrillation(Confirm    



                                         ed)    

 

    



                           Tanner's                 Active  



                                         esophagus(Confirmed)    

 

    



                           Hard of                   Active  



                                         hearing(Confirmed)    

 

    



                           Sleep                     Active  



                                         apnea(Confirmed)    







Allergies, Adverse Reactions, Alerts





No Known Medication Allergies





   



                 Substance       Reaction        Severity        Status

 

   



                           NKFA                      Active







Medications





No Known Medications



Results





No data available for this section



Immunizations





No data available for this section



Procedures







    



              Procedure     Date         Related Diagnosis     Body Site     Status

 

    



                     Laparoscopic repair of inguinal hernia     18            Completed

 

    



                     Gastric bypass                      Completed

 

    



                     Operation                           Completed

 

    



                     Implantation of electronic stimulator of                     Completed



                                         spine    

 

    



                     TKR -Total prosthetic replacement of knee                     Completed



                                         joint using cement    

 

    



                           Esophagogastroduodenoscopy        Completed







Social History







 



                           Social History Type       Response

 

 



                           Substance Abuse           Use: None.

 

 



                           Sexual                    Sexually active: No.  Testicular Self Exam No.

 

 



                           Employment/School         Status: Retired.

 

 



                           Alcohol                   Never

 

 



                           Smoking Status            Current every day smoker; Type: Cigarettes; Concerns about tobacco

 use in



                                         household: Yes; Lives with someone who smokes; Cigarette Smoking Last 365



                                         Days Yes; Reg Smoking Cessation Counseling No



                                         entered on: 19







Assessment and Plan





No data available for this section

## 2019-09-10 NOTE — XMS REPORT
Summary of Care: 18 - 18

                             Created on: 2064



MONROE RUY W

External Reference #: [id number]

: 1950

Sex: Male



Demographics







                          Address                   [address] [place]  [address]-

 

                          Home Phone                [phone]

 

                          Preferred Language        English

 

                          Marital Status            

 

                          Sikh Affiliation     [employer]

 

                          Race                      White

 

                                        Additional Race(s)  

 

                          Ethnic Group              Non-





Author







                          Author                    [facility]

 

                          Organization              [facility]

 

                          Address                   Unknown

 

                          Phone                     Unavailable







Care Team Providers







                    Care Team Member Name    Role                Phone

 

                    Bettina Leroy    PCP                 [phone]







Encounter





HQ Janetter_renae(FIN) [id number] Date(s): 18 - 18

[facility] [address] [place] [address]-  
  [phone]

Discharge Disposition: Home or Self Care

Attending Physician: Kuldeep Mckenzie MD

Referring Physician: Bettina Rueda DO





Vital Signs







 



                           Most recent to            1



                                         oldest [Reference 



                                         Range]: 

 

 



                           Height                    180.34 cm



                                         (18 10:02 AM)

 

 



                           Temperature Oral          97.4 DegF



                           [96.4-99.1 DegF]          (18 10:02 AM)

 

 



                           Blood Pressure            110/75 mmHg



                           [/60-90 mmHg]       (18 10:02 AM)

 

 



                           Peripheral Pulse          74 bpm



                           Rate [ bpm]         (18 10:02 AM)

 

 



                           Weight                    93.892 kg



                                         (18 10:02 AM)

 

 



                           Body Mass Index           28.87 m2



                                         (18 10:02 AM)







Problem List







    



              Condition     Effective Dates     Status       Health Status     Informant

 

    



                           Atrial                    Active  



                                         fibrillation(Confirm    



                                         ed)    

 

    



                           Tanner's                 Active  



                                         esophagus(Confirmed)    

 

    



                           Hard of                   Active  



                                         hearing(Confirmed)    

 

    



                           Sleep                     Active  



                                         apnea(Confirmed)    







Allergies, Adverse Reactions, Alerts





No Known Medication Allergies





   



                 Substance       Reaction        Severity        Status

 

   



                           NKFA                      Active







Medications





atorvastatin 20 mg oral tablet

20 mg=1 tab, PO, Daily, 0 Refill(s)

Start Date: 18

Status: Ordered



multivitamin

Daily, 0 Refill(s)

Start Date: 18

Status: Ordered



omeprazole 20 mg oral delayed release capsule

20 mg=1 cap, PO, Daily, 0 Refill(s)

Start Date: 18

Status: Ordered



sertraline 100 mg oral tablet

100 mg=1 tab, PO, Daily, 0 Refill(s)

Start Date: 18

Status: Ordered



sotalol 80 mg oral tablet

80 mg=1 tab, PO, BID, 0 Refill(s)

Start Date: 18

Status: Ordered



trazodone 50 mg oral tablet

50 mg=1 tab, PO, Bedtime, 0 Refill(s)

Start Date: 18

Status: Ordered



Vitamin C

Daily, 0 Refill(s)

Start Date: 18

Status: Ordered



Xarelto 20 mg oral tablet

20 mg=1 tab, PO, QPM, # 30 tab, 3 Refill(s)

Start Date: 18

Stop Date: 18

Status: Discontinued



Results





No data available for this section



Immunizations





No data available for this section



Procedures







    



              Procedure     Date         Related Diagnosis     Body Site     Status

 

    



                     Laparoscopic repair of inguinal hernia     18            Completed

 

    



                     Gastric bypass                      Completed

 

    



                     Operation                           Completed

 

    



                     Implantation of electronic stimulator of                     Completed



                                         spine    

 

    



                     TKR -Total prosthetic replacement of knee                     Completed



                                         joint using cement    

 

    



                           Esophagogastroduodenoscopy        Completed







Social History







 



                           Social History Type       Response

 

 



                           Substance Abuse           Use: None.

 

 



                           Sexual                    Sexually active: No.  Testicular Self Exam No.

 

 



                           Employment/School         Status: Retired.

 

 



                           Alcohol                   Never

 

 



                           Smoking Status            Current every day smoker; Type: Cigarettes; Concerns about tobacco

 use in



                                         household: Yes; Lives with someone who smokes; Cigarette Smoking Last 365



                                         Days Yes; Reg Smoking Cessation Counseling No



                                         entered on: 19







Assessment and Plan





No data available for this section

## 2019-09-10 NOTE — XMS REPORT
[facility]

                             Created on: 06/10/2014



Agustin Morton

External Reference #: [id number]

: 1950

Sex: Male



Demographics







                          Address                   [address] [place] [address] [place]  [address]

 

                          Home Phone                [phone]

 

                          Preferred Language        Unknown

 

                          Marital Status            Unknown

 

                          Yarsani Affiliation     Unknown

 

                          Race                      Unknown

 

                          Ethnic Group              Non-





Author







                          Author                    Bettina Hsieh

 

                          [facility]              [employer]

 

                          Address                   Unknown

 

                          Phone                     Unavailable







Care Team Providers







                    Care Team Member Name    Role                Phone

 

                    Bettian Hsieh    CP                  Unavailable



                                                                



Allergies, Adverse Reactions, Alerts

          





                    Substance           Reaction            Event Type

 

                    N.K.D.A.            Info Not Available    Non Drug Allergy



                                                                    



Encounters

          





                    Encounter           Location            Date

 

                    Physical FBW        [facility]    May 21,

 2014

 

                    F/U BW              [facility]    2014





                                                                                
                 



Problems

          





             Problem Type    Condition    ICD-9 Code    Onset Dates    Condition Status

 

             Problem      Diverticulosis    562.10                    Active

 

             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

 

             Problem      HTN (hypertension)    401.9                     Active

 

             Assessment    VALERIE (obstructive sleep apnea)    327.23                    Active

 

             Assessment    Prediabetes    790.29                    Active

 

             Problem      Depression    311                       Active

 

             Problem      Hyperlipidemia    272.4                     Active

 

             Problem      Insomnia     780.52                    Active

 

             Problem      Obesity      278.00                    Active

 

             Problem      Prediabetes    790.29                    Active

 

             Problem      A-fib        427.31                    Active

 

             Problem      Hearing loss    389.9                     Active



                                                                                
                                                                                
                                    



Medications

          





        Medication    Code System    Code    Instructions    Start Date    End Date    Status    Dosage



 

        Xarelto    [facility]    [address]-69    20 MG Orally Once a day                    Active    1 tablet

 with food

 

           Trazodone HCl    [facility]    [address]-20    150 MG Orally Once a day    May 17, 2013    

                          Active                    1/2 tablet at bedtime

 

        Restasis    [facility]    [address]-79    0.05 % Ophthalmic Twice a day                    Active    

1  into affected eye

 

        Lexapro    [facility]    [address]-03    30 mg Orally Once a day                    Active    1 tablet



 

        Omeprazole    [facility]    [address]-17    20 MG Orally Once a day                    Active    1 capsule





                                                                                
                                               



Social History

          





                    Social History Element    Qualifiers          Date Reported

 

                    Depression Screening:    .   2014

 

                    Last Colonoscopy:    .  2009

 

                    Ethnicity           .  Status , Is english your primary language? Yes    2014

 

                    children            .  5                2014

 

                    Tobacco Use:        .  Are you a: never smoker    2014

 

                    Use of recreational / street drugs?    .  Answer: No       2014

 

                    Marital Status:     .  Misty    2014

 

                    Do you drink alcohol?    .  Status: No       2014

 

                    Occupation:         .           2014



                                                                                
                                                                                
                



Family history

          





                Qualifier       Description     Comment         Date Reported

 

                Maternal Grandmother            Comment not available    2014

 

                Paternal Grandmother            Comment not available    2014

 

                Father                  heart disease    2014

 

                Maternal Grandfather            heart disease    2014

 

                Mother                  Comment not available    2014

 

                Paternal Grandfather            heart disease    2014



                                                                                
                                                                   



Vital Signs

          





                          Date/Time:                2014

 

                          Weight                    285 lbs

 

                          Height                    71 in

 

                          Cardiac Monitoring Heart Rate    60 /min

 

                          Blood Pressure Diastolic    84 mm Hg

 

                          Blood Pressure Systolic    110 mm Hg



                                                                    



Summary Purpose

          [employer] Submission

## 2019-09-10 NOTE — XMS REPORT
[facility]

                             Created on: 2016



Agustin Morton

External Reference #: [id number]

: 1950

Sex: Male



Demographics







                          Address                   [address] [place] [address] [place]  [address]

 

                          Home Phone                [phone]

 

                          Preferred Language        Unknown

 

                          Marital Status            Unknown

 

                          Confucianism Affiliation     Unknown

 

                          Race                      Unknown

 

                          Ethnic Group              Non-





Author







                          Author                    Clayton Ramirez

 

                          Organization              [employer]

 

                          Address                   Unknown

 

                          Phone                     Unavailable







Care Team Providers







                    Care Team Member Name    Role                Phone

 

                    Clayton Ramirez     CP                  Unavailable



                                                                



Allergies, Adverse Reactions, Alerts

          





                    Substance           Reaction            Event Type

 

                    N.K.D.A.            Info Not Available    Non Drug Allergy



                                                                    



Encounters

          





                    Encounter           Location            Date

 

                    CPAP FOLLOW UP      [facility]    2015

 

                          CUB Notes for CPAP machine    [facility]

                                        2015

 

                          New Appointment Request    [facility]

                                        2015

 

                    req bone density    [facility]    Aug

 07, 2015

 

                    Physical FBW        [facility]    May 21,

 2014

 

                    REFERRAL            [facility]    2016



 

                    F/U BW              [facility]    2014



 

                    Night 2 Sleep Study    [facility]    

2015

 

                    physical exam       [facility]    Nov 10,

 2015

 

                    FOLLOW UP           [facility]    Oct 12, 2015



 

                          New Appointment Request    [facility]

                                        Oct 13, 2015



                                                                                
                                                                                
                          



Problems

          





             Problem Type    Condition    ICD-9 Code    Onset Dates    Condition Status

 

             Problem      Essential hypertension    I10                       Active

 

             Problem      Obesity      E66.9                     Active

 

             Problem      Tanner's esophagus    K22.70                    Active

 

             Problem      Prediabetes    R73.09                    Active

 

             Problem      Mixed hyperlipidemia    E78.2                     Active

 

             Problem      A-fib        I48.91                    Active

 

             Problem      Depressive disorder, not elsewhere classified    F32.9                     Active

 

             Problem      Hearing loss    H91.90                    Active

 

             Problem      VALERIE (obstructive sleep apnea)    G47.33                    Active

 

             Problem      Insomnia     G47.00                    Active

 

             Assessment    Body mass index (BMI) of 40.1-44.9 in adult    Z68.41                    Active

 

             Assessment    Essential hypertension    I10                       Active

 

             Assessment    Depressive disorder, not elsewhere classified    F32.9                     Active

 

             Assessment    Mixed hyperlipidemia    E78.2                     Active

 

             Assessment    Skin lesion    L98.9                     Active

 

             Assessment    A-fib        I48.91                    Active

 

             Problem      Diverticulosis of colon    K57.30                    Active



                                                                                
                                                                                
                                                                                
     



Medications

          





        Medication    Code System    Code    Instructions    Start Date    End Date    Status    Dosage



 

        Lexapro    MEDISPAN    [address]-78    10 mg Orally Once a day                    Active    1 tablet



 

        Omeprazole    [facility]    [address]-61    20 MG Orally Once a day                    Active    1 capsule



 

        Multi For Him 50+    [place]    76711-96632     Orally                     Active    Unknown

 

        Lipitor    [place]    [address]-67    10 MG Orally Once a day                    Active    1 tablet



 

        Zoloft    [place]    [address]-03    25 MG Orally Once a day                    Active    1 tablet



 

        Carvedilol    [place]    [address]-91    6.25 MG Orally Twice a day                    Active    1

 tablet with food

 

        Xarelto    [place]    [address]-22    20 MG Orally Once a day                    Active    1 tablet

 with food

 

        Restasis    [place]    [address]-15    0.05 % Ophthalmic Twice a day                    Active    

1  into affected eye

 

           Trazodone HCl    [place]    [address]-11    150 MG Orally Once a day    May 17, 2013    

                          Active                    1/2 tablet at bedtime



                                                                                
                                                                                
      



Social History

          





                    Social History Element    Qualifiers          Date Reported

 

                    Last Colonoscopy:    .  2009             2016

 

                    children            .  5                2016

 

                    Tobacco Use:        .  Are you a: never smoker    2016

 

                    Use of recreational / street drugs?    .  Answer: No       2016

 

                    Ethnicity           .  Status , Is english your primary language? Yes    2016



 

                    Do you have pets?    .  Status: Yes, Type: dog(s)    2016

 

                    Marital Status:     .  Misty    2016

 

                    Caffeine intake?    .  Status: Yes, What type: Coffee, 1 cup a day    2016

 

                    Do you exercise?    .  Answer: Yes      2016

 

                    Do you drink alcohol?    .  Status: No       2016

 

                    Occupation:         .  , retired    2016



                                                                                
                                                                                
                                    



Family history

          





                Qualifier       Description     Comment         Date Reported

 

                Maternal Grandmother            Comment not available    2016

 

                Paternal Grandmother            Comment not available    2016

 

                Siblings                        Comment not available    2016

 

                Maternal Grandfather            heart disease    2016

 

                Children        alive           Comment not available    2016

 

                Father                  heart disease    2016

 

                Paternal Grandfather            heart disease    2016

 

                Mother                  Comment not available    2016

 

                Other:                          Comment not available    2016



                                                                                
                                                                                
                



Vital Signs

          





                          Date/Time:                2016

 

                          Weight                    294 lbs

 

                          Height                    71 in

 

                          Cardiac Monitoring Heart Rate    82 /min

 

                          Blood Pressure Diastolic    64 mm Hg

 

                          Blood Pressure Systolic    108 mm Hg



                                                                    



Summary Purpose

          [employer]Clinical[employer] Submission

## 2019-09-10 NOTE — XMS REPORT
Clinical Summary

                             Created on: 09/10/2019



PraveenTrinajaz NORRIS

External Reference #: [id number]

: 1950

Sex: Male



Demographics







                          Address                   [address]

[place]  [address]

 

                          Home Phone                [phone]

 

                          Preferred Language        English

 

                          Marital Status            

 

                          Jainism Affiliation     Unknown

 

                          Race                      White

 

                          Ethnic Group              Non-





Author







                          Author                    Umberto Jew

 

                          Organization              [facility]

 

                          Address                   Unknown

 

                          Phone                     Unavailable







Support







                Name            Relationship    Address         Phone

 

                    Misty Morton      [employer]                [address] 

[place]  [address]                     [phone]







Care Team Providers







                    Care Team Member Name    Role                Phone

 

                    Asked, No Pcp       PCP                 Unavailable







Allergies

No Known Allergies



Medications







                          End Date                  Status



              Medication     Sig          Dispensed     Refills      Start  



                                         Date  

 

                                                    Active



                 sertraline (ZOLOFT) 100     Take 100 mg      0               /201  



                     MG tablet           by mouth once       7  



                                         daily.     

 

                                                    Active



                     rivaroxaban (XARELTO) 20     Take 20 mg by       0   



                           mg tablet                 mouth.     

 

                                                    Active



                     atorvastatin (LIPITOR) 10     Take 10 mg by       0   



                           MG tablet                 mouth daily.     

 

                                                    Active



                     tamsulosin (FLOMAX) 0.4     Take 0.4 mg         0   



                           mg capsule,extended       by mouth     



                           release 24hr              daily.     

 

                                                    Active



                     lansoprazole (PREVACID)     Take 30 mg by       0   



                           30 MG capsule             mouth daily.     

 

                                                    Active



                     traZODone (DESYREL) 50 MG     Take 50 mg by       0   



                           tablet                    mouth     



                                         nightly.     

 

                                                    Active



                     finasteride (PROSCAR) 5     Take 5 mg by        0   



                           mg tablet                 mouth daily.     

 

                                                    Active



              sotalol (BETAPACE) 80 MG     TAKE 1 TABLET     180 tablet     3            201  



                     tablet              BY MOUTH            9  



                                         TWICE A DAY     

 

                          2019                Discontinued (Reorder)



              sotalol (SOTALOL AF) 80     Take 1 tablet     180 tablet     3            /201  



                     MG tablet           (80 mg total)       8  



                                         by mouth 2     



                                         (two) times a     



                                         day.     







Active Problems







 



                           Problem                   Noted Date

 

 



                           Dizzy                     2018

 

 



                           Atrial fibrillation       10/24/2017

 

 



                           HTN (hypertension)        10/24/2017

 

 



                           Hyperlipidemia            10/24/2017

 

 



                           H/O bariatric surgery     10/24/2017







Encounters







                          Care Team                 Description



                     Date                Type                Specialty  

 

                                        



Kenneth Riddle MD                      



                     2019          Orders Only         Cardiology  

 

                                        



Kenneth Riddle MD                     Atrial fibrillation, unspecified type (HCC) (Primary Dx)



                     2019          Office Visit        Cardiology  

 

                                        



Kenneth Riddle MD                     Med Refill



                     2019          Refill              Cardiology  

 

                                        



Emmanuel Rob PA-C            Acute pain of right knee (Primary Dx); 

History of total knee replacement, right



                     2019          Office Visit        Orthopedic Surgery  

 

                                        



Kenneth Riddle MD                     Chronic atrial fibrillation (HCC) (Primary Dx); 

Essential hypertension



                     10/23/2018          Office Visit        Cardiology  



after 2018



Social History







                                        Date



                 Tobacco Use     Types           Packs/Day       Years Used 

 

                                        Quit: 



                                         Former Smoker    

 

    



                                         Smokeless Tobacco: Never   



                                         Used   









                    Drinks/Week         oz/Week             Comments



                                         Alcohol Use   

 

                                                             



                                         Yes   









 



                           Sex Assigned at Birth     Date Recorded

 

 



                                         Not on file 









                                        Industry



                           Job Start Date            Occupation 

 

                                        Not on file



                           Not on file               Not on file 









                                        Travel End



                           Travel History            Travel Start 

 





                                         No recent travel history available.







Last Filed Vital Signs







                    Reading             Time Taken          Comments



                                         Vital Sign   

 

                    112/81              2019  7:59 AM CDT     



                                         Blood Pressure   

 

                    54                  2019  7:59 AM CDT     



                                         Pulse   

 

                    -                   -                    



                                         Temperature   

 

                    -                   -                    



                                         Respiratory Rate   

 

                    -                   -                    



                                         Oxygen Saturation   

 

                    -                   -                    



                                         Inhaled Oxygen   



                                         Concentration   

 

                    91.9 kg (202 lb 9.6 oz)    2019  7:59 AM CDT     



                                         Weight   

 

                    180.3 cm (5' 11")    2019  9:03 AM CDT     



                                         Height   

 

                    28.26               2019  9:03 AM CDT     



                                         Body Mass Index   







Plan of Treatment







                          Care Team                 Description



                     Date                Type                Specialty  

 

                                        



Kenneth Riddle MD



[address] [address] [phone] (Fax)                       



                     2020          Office Visit        Cardiology  









   



                 Health Maintenance     Due Date        Last Done       Comments

 

   



                           COLONOSCOPY SCREENING     1998  

 

   



                           SHINGLES VACCINES (#1)     1998  

 

   



                           65+ PNEUMOCOCCAL VACCINE     2013  



                                         (1 of 2 - PCV13)   

 

   



                           INFLUENZA VACCINE         2019  







Procedures







                                        Comments



                 Procedure Name     Priority        Date/Time       Associated Diagnosis 

 

                                         



                     GENERAL SURGERY     Routine             2019  

 

                                        



Results for this procedure are in the results section.



                 XR KNEE 3 VW RIGHT     Routine         2019      Acute pain of right knee 



                                         9:06 AM CDT  

 

                                        



Results for this procedure are in the results section.



                 XR LEG LENGTH EVALUATION     Routine         2019      Acute pain of right knee 



                                         9:06 AM CDT  



after 2018



Results

* General surgery (2019)





 



                           Narrative                 Performed At

 

 



                                         This result has an attachment that is not available. 





* XR Knee 3 Vw Right (2019  9:06 AM CDT)









                                         Specimen

 











 



                           Narrative                 Performed At

 

 



                           This result has an attachment that is not available.      [facility]



                                         Three-view x-rays of the right knee show a properly placed total knee 



                                         arthroplasty with the patella tracking concentrically.No evidence of 



                                         lucency or loosening of prosthesis. 









   



                 Performing Organization     Address         City/[facility]/Zipcode     Phone Number

 

   



                      [facility]          [address] [place] [address] 





* XR Leg Length Evaluation (2019  9:06 AM CDT)









                                         Specimen

 











 



                           Narrative                 Performed At

 

 



                           This result has an attachment that is not available.      [facility]



                                         Leg alignment films show mechanical axis of the left lower extremity fall 



                                         through the midline of the knee where there is a properly placed left 



                                         total knee arthroplasty with what appears to be either a 20 or 22 mm 



                                         poly-.On the opposite right lower extremity the chemical axis falls to 



                                         the midline as well where there is a properly placed total knee 



                                         arthroplasty.Bilateral hips and ankles are essentially normal. 









   



                 Performing Organization     Address         [facility]     Phone Number

 

   



                     [facility]          [address] [place] [address] 





after 2018



Insurance







                                        Type



            Payer      Benefit     Subscriber ID     Effective     Phone      Address 



                           Plan /                    Dates   



                                         Group     

 

                                        HMO



                 AETNA MEDICARE     AETNA           xxxxxxxx        2014-P   



                           MEDICARE                  resent   



                                         HMO/PPO     



                                         [facility]     









     



            Guarantor Name     Account     Relation to     Date of     Phone      Billing Address



                     Type                Patient             Birth  

 

     



            Agustin Morton W     Personal/F     Self       1950     [phone] [address]               (Home)              [place] [address]



                                         [phone] 



                                         (Work) 







Advance Directives





For more information, please contact: [phone]









                          Patient Representative    Explanation



                           Type                      Date Recorded  

 

                                                     



                                         Advance Directives,   



                                         Living Will and   



                                         Medical Power of

## 2019-09-10 NOTE — XMS REPORT
Summary of Care: 18 - 18

                             Created on: 2078



RUY CHILDRESS MYRNA

External Reference #: [id number]

: 1950

Sex: Male



Demographics







                          Address                   [address] [place]  [address]-

 

                          Home Phone                [phone]

 

                          Preferred Language        English

 

                          Marital Status            

 

                          Yazidism Affiliation     [employer]

 

                          Race                      White

 

                                        Additional Race(s)  

 

                          Ethnic Group              Non-





Author







                          Author                    [facility]

 

                          Organization              [facility]

 

                          Address                   Unknown

 

                          Phone                     Unavailable







Care Team Providers







                    Care Team Member Name    Role                Phone

 

                    Bettina Leroy    PCP                 [phone]







Encounter





HQ Chanel(FIN) [phone] Date(s): 18 - 18

[facility] [address] [place] [address]-     [phone]
[phone]

Discharge Disposition: Home or Self Care

Attending Physician: Dilshad Schuster MD





Vital Signs







 



                           Most recent to            1



                                         oldest [Reference 



                                         Range]: 

 

 



                           Height                    180.34 cm



                                         (18 10:51 AM)

 

 



                           Blood Pressure            118/83 mmHg



                           [/60-90 mmHg]       (18 10:51 AM)

 

 



                           Peripheral Pulse          76 bpm



                           Rate [ bpm]         (18 10:51 AM)

 

 



                           Weight                    90.909 kg



                                         (18 10:51 AM)

 

 



                           Body Mass Index           27.95 m2



                                         (18 10:51 AM)







Problem List







    



              Condition     Effective Dates     Status       Health Status     Informant

 

    



                           Atrial                    Active  



                                         fibrillation(Confirm    



                                         ed)    

 

    



                           Tanner's                 Active  



                                         esophagus(Confirmed)    

 

    



                           Hard of                   Active  



                                         hearing(Confirmed)    

 

    



                           Sleep                     Active  



                                         apnea(Confirmed)    







Allergies, Adverse Reactions, Alerts





No Known Medication Allergies





   



                 Substance       Reaction        Severity        Status

 

   



                           NKFA                      Active







Medications





No Known Medications



Results





No data available for this section



Immunizations





No data available for this section



Procedures







    



              Procedure     Date         Related Diagnosis     Body Site     Status

 

    



                     Laparoscopic repair of inguinal hernia     16            Completed

 

    



                     Gastric bypass                      Completed

 

    



                     Operation                           Completed

 

    



                     Implantation of electronic stimulator of                     Completed



                                         spine    

 

    



                     TKR -Total prosthetic replacement of knee                     Completed



                                         joint using cement    

 

    



                           Esophagogastroduodenoscopy        Completed







Social History







 



                           Social History Type       Response

 

 



                           Substance Abuse           Use: None.

 

 



                           Sexual                    Sexually active: No.  Testicular Self Exam No.

 

 



                           Employment/School         Status: Retired.

 

 



                           Alcohol                   Never

 

 



                           Smoking Status            Current every day smoker; Type: Cigarettes; Concerns about tobacco

 use in



                                         household: Yes; Lives with someone who smokes; Cigarette Smoking Last 365



                                         Days Yes; Reg Smoking Cessation Counseling No



                                         entered on: 19







Assessment and Plan





No data available for this section

## 2019-09-10 NOTE — XMS REPORT
[facility]

                             Created on: 2015



Agustin Morton

External Reference #: [id number]

: 1950

Sex: Male



Demographics







                          Address                   [address] [place] [address] [place]  [address]

 

                          Home Phone                [phone]

 

                          Preferred Language        Unknown

 

                          Marital Status            Unknown

 

                          Yazdanism Affiliation     Unknown

 

                          Race                      Unknown

 

                          Ethnic Group              Non-





Author







                          Author                    Clayton Ramirez

 

                          Organization              [employer]

 

                          Address                   Unknown

 

                          Phone                     Unavailable







Care Team Providers







                    Care Team Member Name    Role                Phone

 

                    Clayton Ramirez     CP                  Unavailable



                                                                



Allergies, Adverse Reactions, Alerts

          





                    Substance           Reaction            Event Type

 

                    N.K.D.A.            Info Not Available    Non Drug Allergy



                                                                    



Encounters

          





                    Encounter           Location            Date

 

                    CPAP FOLLOW UP      [facility]    2015

 

                          CUB Notes for CPAP machine    [facility]

                                        2015

 

                    Physical FBW        [facility]    May 21,

 2014

 

                    F/U BW              [facility]    2014



 

                    Night 2 Sleep Study    [facility]    

2015



                                                                                
                                               



Problems

          





             Problem Type    Condition    ICD-9 Code    Onset Dates    Condition Status

 

             Problem      Diverticulosis    562.10                    Active

 

             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

 

             Problem      HTN (hypertension)    401.9                     Active

 

             Assessment    VALERIE (obstructive sleep apnea)    327.23                    Active

 

             Problem      Depression    311                       Active

 

             Problem      Hyperlipidemia    272.4                     Active

 

             Problem      Insomnia     780.52                    Active

 

             Problem      Obesity      278.00                    Active

 

             Problem      Prediabetes    790.29                    Active

 

             Problem      A-fib        427.31                    Active

 

             Problem      Hearing loss    389.9                     Active



                                                                                
                                                                                
                          



Medications

          





        Medication    Code System    Code    Instructions    Start Date    End Date    Status    Dosage



 

        Restasis    [facility]    [address]-07    0.05 % Ophthalmic Twice a day                    Active    

1  into affected eye

 

        Multi For Him 50+    [facility]    [address]     Orally                     Active    Unknown

 

           Trazodone HCl    [facility]    [address]-43    150 MG Orally Once a day    May 17, 2013    

                          Active                    1/2 tablet at bedtime

 

        Xarelto    [facility]    [address]-08    20 MG Orally Once a day                    Active    1 tablet

 with food

 

        Omeprazole    [facility]    [address]-20    20 MG Orally Once a day                    Active    1 capsule



 

        Carvedilol    [facility]    [address]-89    6.25 MG Orally Twice a day                    Active    1

 tablet with food

 

        Lexapro    [facility]    [address]-93    30 mg Orally Once a day                    Active    1 tablet





                                                                                
                                                                   



Social History

          





                    Social History Element    Qualifiers          Date Reported

 

                    Last Colonoscopy:    .  2009

 

                    Ethnicity           .  Status , Is english your primary language? Yes    2015

 

                    children            .  5                2015

 

                    Tobacco Use:        .  Are you a: never smoker    2015

 

                    Use of recreational / street drugs?    .  Answer: No       2015

 

                    Marital Status:     .  Misty    2015

 

                    Do you drink alcohol?    .  Status: No       2015

 

                    Occupation:         .           2015



                                                                                
                                                                                
      



Family history

          





                Qualifier       Description     Comment         Date Reported

 

                Maternal Grandmother            Comment not available    2015

 

                Paternal Grandmother            Comment not available    2015

 

                Children        alive           Comment not available    2015

 

                Maternal Grandfather            heart disease    2015

 

                Father                  heart disease    2015

 

                Mother                  Comment not available    2015

 

                Paternal Grandfather            heart disease    2015



                                                                                
                                                                             



Vital Signs

          





                          Date/Time:                2015

 

                          Weight                    278 lbs

 

                          Height                    71 in

 

                          Cardiac Monitoring Heart Rate    84 /min

 

                          Blood Pressure Diastolic    74 mm Hg

 

                          Blood Pressure Systolic    116 mm Hg



                                                                    



Summary Purpose

          [employer]Clinical[employer] Submission

## 2019-09-10 NOTE — NUR
SPIRITUAL CARE - Pre-Surgery



Assessment:  Pt in bed. Pt's wife at bedside. Pt reported supportive attention from family 
and friends. 

Intervention: I provided pastoral presence, hospitality, and sympathetic listening.  I 
acquainted pt with availability of  while hospitalized.

Outcome: Pt expressed appreciation for visit. No need for follow up indicated at this time. 



NIRALI Trujillolain

Spiritual Care Department

O: [phone]

Pager: [phone] ([phone])

## 2019-09-10 NOTE — XMS REPORT
[facility]

                             Created on: 06/10/2014



Agustin Morton

External Reference #: [id number]

: 1950

Sex: Male



Demographics







                          Address                   [address] [place] [address] [place]  [address]

 

                          Home Phone                [phone]

 

                          Preferred Language        Unknown

 

                          Marital Status            Unknown

 

                          Advent Affiliation     Unknown

 

                          Race                      Unknown

 

                          Ethnic Group              Non-





Author







                          Author                    Bettina Hsieh

 

                          [facility]              [employer]

 

                          Address                   Unknown

 

                          Phone                     Unavailable







Care Team Providers







                    Care Team Member Name    Role                Phone

 

                    Jori  Bettina    CP                  Unavailable



                                                                



Allergies, Adverse Reactions, Alerts

          





                    Substance           Reaction            Event Type

 

                    N.K.D.A.            Info Not Available    Non Drug Allergy



                                                                    



Encounters

          





                    Encounter           Location            Date

 

                    Physical FBW        [facility]    May 21,

 2014

 

                    F/U BW              [facility]    2014





                                                                                
                 



Problems

          





             Problem Type    Condition    ICD-9 Code    Onset Dates    Condition Status

 

             Problem      Diverticulosis    562.10                    Active

 

             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

 

             Problem      HTN (hypertension)    401.9                     Active

 

             Problem      Depression    311                       Active

 

             Assessment    Obesity      278.00                    Active

 

             Problem      Hyperlipidemia    272.4                     Active

 

             Problem      Insomnia     780.52                    Active

 

             Problem      Obesity      278.00                    Active

 

             Problem      Prediabetes    790.29                    Active

 

             Problem      A-fib        427.31                    Active

 

             Problem      Hearing loss    389.9                     Active

 

             Assessment    Hyperlipidemia    272.4                     Active

 

             Assessment    Depression    311                       Active

 

             Assessment    Hearing loss    389.9                     Active

 

             Assessment    A-fib        427.31                    Active

 

             Assessment    Diverticulosis    562.10                    Active

 

             Assessment    HTN (hypertension)    401.9                     Active

 

             Assessment    Insomnia     780.52                    Active

 

             Assessment    Special screening for malignant neoplasms, colon    V76.51                    Active

 

             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

 

             Assessment    Encounter for general health examination    V70.0                     Active



                                                                                
                                                                                
                                                                                
                                             



Medications

          





        Medication    Code System    Code    Instructions    Start Date    End Date    Status    Dosage



 

        Lexapro    [facility]    [address]-53    30 mg Orally Once a day                    Active    1 tablet



 

        Restasis    [facility]    [address]-95    0.05 % Ophthalmic Twice a day                    Active    

1  into affected eye

 

        Omeprazole    [facility]    [address]-27    20 MG Orally Once a day                    Active    1 capsule



 

           Trazodone HCl    [facility]    [address]-03    150 MG Orally Once a day    May 17, 2013    

                          Active                    1/2 tablet at bedtime

 

        Xarelto    [facility]    [address]-50    20 MG Orally Once a day                    Active    1 tablet

 with food



                                                                                
                                               



Social History

          





                    Social History Element    Qualifiers          Date Reported

 

                    Depression Screening:    .   2014

 

                    Last Colonoscopy:    .  2009

 

                    Ethnicity           .  Status , Is english your primary language? Yes    2014

 

                    children            .  5                2014

 

                    Tobacco Use:        .  Are you a: never smoker    2014

 

                    Use of recreational / street drugs?    .  Answer: No       2014

 

                    Marital Status:     .  Misty    2014

 

                    Do you drink alcohol?    .  Status: No       2014

 

                    Occupation:         .           2014



                                                                                
                                                                                
                



Family history

          





                Qualifier       Description     Comment         Date Reported

 

                Maternal Grandmother            Comment not available    2014

 

                Paternal Grandmother            Comment not available    2014

 

                Father                  heart disease    2014

 

                Maternal Grandfather            heart disease    2014

 

                Mother                  Comment not available    2014

 

                Paternal Grandfather            heart disease    2014



                                                                                
                                                                   



Vital Signs

          





                          Date/Time:                May 21, 2014

 

                          Weight                    281 lbs

 

                          Height                    71 in

 

                          Temperature               97.7 F

 

                          Cardiac Monitoring Heart Rate    60 /min

 

                          Blood Pressure Diastolic    78 mm Hg

 

                          Blood Pressure Systolic    110 mm Hg



                                                                    



Results

          





                                         

 

                                        COMPREHENSIVE METABOLIC PANEL

 

                          CALCIUM(-8.6-10.3 mg/dL)    9.4

 

                          CARBON DIOXIDE(-19-30 mmol/L)    27

 

                          ALT(-9-46 U/L)            21

 

                          CREATININE(-0.70-1.25 mg/dL)    0.95

 

                          AST(-10-35 U/L)           23

 

                          eGFR NON-AFR. AMERICAN(-> OR=60 mL/min/1.73m2)    83

 

                          ALKALINE PHOSPHATASE(- U/L)    64

 

                          eGFR AFRICAN AMERICAN(-> OR=60 mL/min/1.73m2)    96

 

                          BILIRUBIN, TOTAL(-0.2-1.2 mg/dL)    0.7

 

                          BUN/CREATININE RATIO(-6-22 (calc))    NOT APPLICABLE

 

                          ALBUMIN/GLOBULIN RATIO(-1.0-2.5 (calc))    1.4

 

                          SODIUM(-135-146 mmol/L)    145

 

                          GLOBULIN(-1.9-3.7 g/dL (calc))    2.9

 

                          POTASSIUM(-3.5-5.3 mmol/L)    4.8

 

                          GLUCOSE(-65-99 mg/dL)     104

 

                          CHLORIDE(- mmol/L)    108

 

                          ALBUMIN(-3.6-5.1 g/dL)    4.2

 

                          UREA NITROGEN (BUN)(-7-25 mg/dL)    13

 

                          PROTEIN, TOTAL(-6.1-8.1 g/dL)    7.1

 

                                        VITAMIN D, 1,25 DIHYDROXY LC/MS/MS

 

                          VITAMIN D, 1,25 (OH)2, TOTAL(-18-72 pg/mL)    35

 

                          VITAMIN D2, 1,25 (OH)2(- pg/mL)    <8

 

                          VITAMIN D3, 1,25 (OH)2(- pg/mL)    35

 

                                        TEST IN QUESTION- MISC QUESTION

 

                          QUESTION:(- )             PSA TOTAL

 

                          QUESTION:(- )             PSA TOTAL

 

                          QUESTION:(- )             PSA TOTAL

 

                          QUESTION:(- )             PSA TOTAL

 

                                        TEST AUTHORIZATION

 

                          TEST CODE:(- )             [id number]

 

                          CLIENT CONTACT:(- )       RACQUEL TAYLOR

 

                          TEST(S) ORDERED ON REQUISITION(- )     PSA, TOTAL

 

                                        LIPID PANEL

 

                          TRIGLYCERIDES(-<150 mg/dL)    77

 

                          LDL-CHOLESTEROL(-<130 mg/dL (calc))    128

 

                          CHOLESTEROL, TOTAL(-125-200 mg/dL)    198

 

                          HDL CHOLESTEROL(-> OR=40 mg/dL)    55

 

                          CHOL/HDLC RATIO(-< OR=5.0 (calc))    3.6

 

                          NON HDL CHOLESTEROL(- mg/dL (calc))    143

 

                                        URINALYSIS, COMPLETE

 

                          BILIRUBIN(-NEGATIVE )     NEGATIVE

 

                          GLUCOSE(-NEGATIVE )       NEGATIVE

 

                          PH(-5.0-8.0 )             6.0

 

                          SPECIFIC GRAVITY(-1.001-1.035 )    1.025

 

                          PROTEIN(-NEGATIVE )       NEGATIVE

 

                          OCCULT BLOOD(-NEGATIVE )    NEGATIVE

 

                          KETONES(-NEGATIVE )       NEGATIVE

 

                          WBC(-< OR=5 /HPF)         NONE SEEN

 

                          RBC(-< OR=3 /HPF)         NONE SEEN

 

                          SQUAMOUS EPITHELIAL CELLS(-< OR=5 /HPF)    NONE SEEN

 

                          BACTERIA(-NONE SEEN /HPF)    NONE SEEN

 

                          APPEARANCE(-CLEAR )       CLEAR

 

                          COLOR(-YELLOW )           DARK YELLOW

 

                          NITRITE(-NEGATIVE )       NEGATIVE

 

                          LEUKOCYTE ESTERASE(-NEGATIVE )    NEGATIVE

 

                          HYALINE CAST(-NONE SEEN /LPF)    NONE SEEN

 

                                        CBC (INCLUDES DIFF/PLT)

 

                          WHITE BLOOD CELL COUNT(-3.8-10.8 Thousand/uL)    6.9

 

                          RED BLOOD CELL COUNT(-4.20-5.80 Million/uL)    4.97

 

                          HEMOGLOBIN(-13.2-17.1 g/dL)    15.8

 

                          RDW(-11.0-15.0 %)         14.0

 

                          PLATELET COUNT(-140-400 Thousand/uL)    176

 

                          BASOPHILS(- %)            0.4

 

                          EOSINOPHILS(- %)          2.8

 

                          HEMATOCRIT(-38.5-50.0 %)    47.7

 

                          MONOCYTES(- %)            7.6

 

                          MCV(-80.0-100.0 fL)       95.8

 

                          LYMPHOCYTES(- %)          32.9

 

                          MCH(-27.0-33.0 pg)        31.7

 

                          NEUTROPHILS(- %)          56.3

 

                          MCHC(-32.0-36.0 g/dL)     33.1

 

                          ABSOLUTE NEUTROPHILS(-0112-8325 cells/uL)    3885

 

                          ABSOLUTE LYMPHOCYTES(-850-3900 cells/uL)    2270

 

                          ABSOLUTE MONOCYTES(-200-950 cells/uL)    524

 

                          ABSOLUTE EOSINOPHILS(- cells/uL)    193

 

                          ABSOLUTE BASOPHILS(-0-200 cells/uL)    28

 

                                        EKG W/INTERP/ELECTR

 

                                        HEMOGLOBIN A1c

 

                          HEMOGLOBIN A1c(-<5.7 % of total Hgb)    5.8

 

                                        TSH

 

                          TSH(-0.40-4.50 mIU/L)     2.25

 

                                        PSA, TOTAL

 

                          PSA, TOTAL(-< OR=4.0 ng/mL)    1.3



                                                                                
                                                                                
      



Summary Purpose

          eClinicalWorks Submission

## 2021-06-14 NOTE — XMS REPORT
Continuity of Care Document

                             Created on: 09/10/2019



RUY CHILDRESS

External Reference #: [id number]

: 1950

Sex: Male



Demographics







                          Address                   [address]  [address]

 

                          Home Phone                [phone]

 

                          Preferred Language        English

 

                          Marital Status            Unknown

 

                          Scientology Affiliation     Unknown

 

                          Race                      Unknown

 

                          Ethnic Group              Unknown





Author







                          Author                    [facility]

 

                          Organization              [facility]

 

                          Address                   Unknown

 

                          Phone                     Unavailable







Care Team Providers







                    Care Team Member Name    Role                Phone

 

                    [employer]    Unavailable         Unavailable



                                    



Problems

                    





                    Problem                            Status                            Onset Date     

                          Classification                            Date Reported       

                          Comments                            Source                    

 

                                        Bilateral inguinal hernia, without obstruction or gangrene, not specified as recurrent

                                                        2019                                        

                                         [facility]                    

 

                    LAP HERNIA REPAIR                            Active                            2018

                                                                                       

                                        [facility]                    

 

                          Diverticulosis of colon                            Active                         

                                                Problem                            2018           

                                                      [facility]   

                 

 

                    Hearing loss                            Active                                      

                          Problem                            2018                      

                                                      [facility]              

      

 

                          Depressive disorder, not elsewhere classified                            Active   

                                                      Problem                         

                    2018                                                        [facility]                    

 

                    Prediabetes                            Active                                       

                          Problem                            2018                       

                                                      [facility]               

     

 

                    A-fib                            Active                                             

                    Problem                            2018                               

                                        [facility]                    

 

                                        Benign prostatic hyperplasia with lower urinary tract symptoms                  

                    Active                                                        Problem        

                    2018                                                        [facility]                    

 

                    Insomnia                            Active                                          

                    Problem                            2018                            

                                        [facility]                  

  

 

                    Obesity                            Active                                           

                    Problem                            2018                             

                                        [facility]                   

 

 

                    Mixed hyperlipidemia                            Active                              

                          Problem                            2018              

                                                      [facility]      

              

 

                          VALERIE (obstructive sleep apnea)                            Active                   

                                                Problem                            2018     

                                                      [facility]

                    

 

                                        Unilateral inguinal hernia without obstruction or gangrene, recurrence not specified

                            Active                                                   

                    Diagnosis                            2018                                   

                                        [facility]                    

 

                          Feeling of incomplete bladder emptying                            Active          

                                                Diagnosis                            2018

                                                        [facility]                    

 

                    Tanner's esophagus                            Active                               

                          Problem                            2018               

                                                      [facility]       

             

 

                    Essential hypertension                            Active                            

                            Problem                            2018            

                                                      [facility]    

                

 

                    Diverticulosis                            Active                                    

                          Problem                            2015                    

                                                      [facility]            

        

 

                    Tanner esophagus                            Active                                 

                          Problem                            2015                 

                                                      [facility]         

           

 

                    HTN (hypertension)                            Active                                

                          Problem                            2015                

                                                      [facility]        

            

 

                          VALERIE (obstructive sleep apnea)                            Active                   

                                                Diagnosis                            2015   

                                                      [facility]                    

 

                    Prediabetes                            Active                                       

                          Problem                            2015                       

                                                      [facility]               

     

 

                    Depression                            Active                                        

                          Problem                            2015                        

                                                      [facility]                

    

 

                    Hyperlipidemia                            Active                                    

                          Problem                            2015                    

                                                      [facility]            

        

 

                    Insomnia                            Active                                          

                    Problem                            2015                            

                                        [facility]                  

  

 

                    Obesity                            Active                                           

                    Problem                            2015                             

                                        [facility]                   

 

 

                    A-fib                            Active                                             

                    Problem                            2015                               

                                        [facility]                    

 

                    Hearing loss                            Active                                      

                          Problem                            2015                      

                                                      [facility]              

      

 

                          Special screening for malignant neoplasms, colon                            Active

                                                        Diagnosis                    

                    2014                                                        [facility]                    

 

                          Encounter for general health examination                            Active        

                                                Diagnosis                            2014

                                                        [facility]                    

 

                                        Need for prophylactic vaccination with Streptococcus pneumoniae (Pneumococcus) and

 Influenza vaccines                            Active                                

                          Diagnosis                            2015              

                                                      [facility]      

              

 

                                        Routine general medical examination at a health care facility                   

                    Active                                                        Diagnosis       

                     2015                                                        

[facility]                    

 

                          Severe obesity (BMI >=40)                            Active                       

                                                Diagnosis                            2015       

                                                      [facility]

                    

 

                          Body mass index (BMI) of 40.1-44.9 in adult                            Active     

                                                      Diagnosis                         

                    02/10/2016                                                        [facility]                    

 

                    Skin lesion                            Active                                       

                          Diagnosis                            02/10/2016                     

                                                      [facility]             

       

 

                          Atrial fibrillation (disorder)                            Active                  

                                                Problem                            2019    

                                                       [facility]    

                

 

                          Tanner's esophagus (disorder)                            Active                  

                                                Problem                            2019    

                                                       [facility]    

                

 

                    Hearing loss (finding)                            Active                            

                            Problem                            2019            

                                                       [facility]            

        

 

                    Sleep apnea (finding)                            Active                             

                           Problem                            2019             

                                                       [facility]             

       

 

                          Gastro-esophageal reflux disease without esophagitis                              

                                                                                  2019

                                                        [facility]                 

   

 

                    Hyperlipidemia, unspecified                                                         

                                                       2019                    

                                                      [facility]                    

 

                          Unspecified atrial fibrillation                                                   

                                                                            2019                

                                                      [facility]                    

 

                          Unspecified right bundle-branch block                                             

                                                                            2019          

                                                      [facility]                    

 

                          Long term (current) use of anticoagulants                                         

                                                                            2019      

                                                      [facility]                    

 

                          Benign lipomatous neoplasm of spermatic cord                                      

                                                                            2019   

                                                      [facility]                    



 

                    Bariatric surgery status                                                            

                                                      2019                       

                                                      [facility]                    

 

                                        Atherosclerotic heart disease of native coronary artery without angina pectoris 

                                                                                       

                          2019                                                   

                                        [facility]                    



                                                                                
                                                                                
                                                                                
                                                                                
                                                                                
                                                                                
                                                                                
                                                                                
                                                                                
                                                               



Medications

                    





                    Medication                            Details                            Route      

                          Status                            Patient Instructions         

                          Ordering Provider                            Order Date           

                                        Source                    

 

                          Docusate Sodium 100 MG Oral Capsule [Colace]                            100 mg=1 cap,

 PO, BID, PRN Constipation, # 20 cap, 0 Refill(s)                                  

                      Active                                                           

                          2018                            [facility]          

          

 

                                        Acetaminophen 300 MG / Codeine Phosphate 30 MG Oral Tablet [Tylenol with Codeine

 #3]                                    1 tab, PO, Q6H, PRN pain, X 7 day, # 28 tab, 0 Refill(s)

                                                        No Longer Active             

                                                                            2018      

                                        [facility]                    

 

                          neostigmine (ANES)                            Route: IV, Drug form: INJ, ONCE, Stop

 date: 18 9:43:00 CST                                                        Inactive

                                                                                    2018

                                        [facility]                    

 

                          dexamethasone (ANES)                            Route: IV, Drug form: INJ, ONCE, Stop

 date: 18 9:43:00 CST                                                        Inactive

                                                                                    2018

                                        [facility]                    

 

                          ondansetron (ANES)                            Route: IV, Drug form: INJ, ONCE, Stop

 date: 18 9:43:00 CST                                                        Inactive

                                                                                    2018

                                        [facility]                    

 

                          glycopyrrolate (ANES)                            Route: IV, Drug form: INJ, ONCE, 

Stop date: 18 9:43:00 CST                                                    

                    Inactive                                                                           

                          2018                            [facility]                    

 

                          ePHEDrine (ANES)                            Route: IV, Drug form: INJ, ONCE, Stop 

date: 18 9:43:00 CST                                                        Inactive

                                                                                    2018

                                        [facility]                    

 

                          ropivacaine                            Dosing: Per Nerve Block Dosing Order, Route:

 NERVE BLOCK, Start date: 18 9:17:00  mL, Drug Form: INJ, Dosing 
Weight 90.909, kg, Duration: 30 day, Stop date: 19 9:16:00 CSTNotes: Final
concentration:  Ropivacaine 0.2% 400 ml                                            

                    Inactive                                                                   

                          2018                            [facility]                  

  

 

                          ropivacaine                            Dosing: Per Nerve Block Dosing Order, Route:

 NERVE BLOCK, Start date: 18 9:16:00  mL, Dosing Weight 90.909, kg,
Duration: 30 day, Stop date: 19 9:15:00 CST                                  

                      Inactive                                                         

                           2018                            [facility]        

            

 

                          propofol (ANES)                            Route: IV, Drug form: INJ, ONCE, Stop date:

 18 9:13:00 CST                                                        Inactive

                                                                                    2018

                                        [facility]                    

 

                          ceFAZolin (ANES)                            Route: IV, Drug form: INJ, ONCE, Stop 

date: 18 9:13:00 CST                                                        Inactive

                                                                                    2018

                                        [facility]                    

 

                          rocuronium (ANES)                            Route: IV, Drug form: INJ, ONCE, Stop

 date: 18 9:13:00 CST                                                        Inactive

                                                                                    2018

                                        [facility]                    

 

                          fentaNYL (ANES)                            Route: IV, Drug form: INJ, ONCE, Stop date:

 18 9:13:00 CST                                                        Inactive

                                                                                    2018

                                        [facility]                    

 

                          lidocaine (ANES)                            Route: IV, Drug form: INJ, ONCE, Stop 

date: 18 9:13:00 CST                                                        Inactive

                                                                                    2018

                                        [facility]                    

 

                          midazolam (ANES)                            Route: IV, Drug form: SOLN, ONCE, Stop

 date: 18 9:13:00 CST                                                        Inactive

                                                                                    2018

                                        [facility]                    

 

                          Lactated Ringers Injection IV (ANES) 1000 mL                            Route: IV,

 Total Volume: 1,000, Start date: 18 8:23:00 CST, Stop date: 18 
9:23:00 CST                                                        Inactive          

                                                                            2018   

                                        [facility]                    

 

                                        Calcium Chloride 0.0014 MEQ/ML / Potassium Chloride 0.004 MEQ/ML / Sodium Chloride

 0.103 MEQ/ML / Sodium Lactate 0.028 MEQ/ML Injectable Solution                 
                                        1,000 mL, Rate: 25 ml/hr, Infuse over: 40 hr, Route: IV, Dosing Weight 90.909

 kg, Total Volume: 1,000, Start date: 18 8:15:00 CST, Duration: 30 day, 
Stop date: 19 8:14:00 CST, 2.15, m2                                          

                    Inactive                                                                 

                          2018                            [facility]                

    

 

                          Ancef + sterile water 20 mL                            2 gm, Route: IVP, ONCE, Dosing

 Weight 90.909, kg, Start date: 18 8:17:00 CST, Stop date: 18 
8:17:00 CST, Surgical Prophylaxis Only; For patients Notes: (Same As: Ancef, 
Caseyfzol)    *** MEDICATION WASTE *** Product Size:  1000 mg Product Wasted:  ___ 
mg                                                        No Longer Active           

                                                                            2018    

                                        [facility]                    

 

                          Hair, Skin & Nails                            5 mg=, PO, Daily, 0 Refill(s)       

                                                 Active                                

                                                      2018                       

                                        [facility]                    

 

                          Trazodone Hydrochloride 50 MG Oral Tablet                            50 mg=1 tab, 

PO, Bedtime, 0 Refill(s)                                                        Active

                                                                                    2018

                                         [facility]                    

 

                          sertraline 100 mg oral tablet                            100 mg=1 tab, PO, Daily, 

0 Refill(s)                                                        Active            

                                                                            2018     

                                         [facility]                    

 

                    Vitamin C                            Daily, 0 Refill(s)                             

                           Active                                                    

                                                2018                             [facility] 

                   

 

                          multivitamin                            Daily, 0 Refill(s)                        

                                                Active                                                 

                                                2018                             [facility]

                    

 

                          atorvastatin 20 mg oral tablet                            20 mg=1 tab, PO, Daily, 

0 Refill(s)                                                        Active            

                                                                            2018     

                                         [facility]                    

 

                          sotalol 80 mg oral tablet                            80 mg=1 tab, PO, BID, 0 Refill(s)

                                                        Active                       

                                                                            2018                

                                         [facility]                    

 

                          omeprazole 20 mg oral delayed release capsule                            20 mg=1 cap,

 PO, Daily, 0 Refill(s)                                                        Active

                                                                                    2018

                                         [facility]                    

 

                          rivaroxaban 20 MG Oral Tablet [Xarelto]                            20 mg=1 tab, PO,

 QPM, # 30 tab, 3 Refill(s)                                                        No

 Longer Active                                                                       

                          2018                             [facility]                  

  

 

                          Trazodone HCl                            1/2 tablet at bedtime                    

                    Orally                            Active                            150 MG Orally

 Once a day                            Valeriano Spence                            2013

                                        [facility]                  

  

 

                          Trazodone HCl                            1/2 tablet at bedtime                    

                    Orally                            Active                            150 MG Orally

 Once a day                            James                            2013

                                        [facility]                  

  

 

                    Multi For Him 50+                            not defined                            

Orally                            Active                            Orally           

                    [facility]                    

 

                    Omeprazole                            1 capsule                            Orally   

                          Active                            20 MG Orally Once a day   

                          [facility]                    

 

                    Xarelto                            1 tablet with food                            Orally

                            Active                            20 MG Orally Once a day

                            [facility]                    

 

                    Zoloft                            1 tablet                            Orally        

                          Active                            25 MG Orally Once a day        

                          [facility]                    

 

                    Restasis                            1  into affected eye                            

Ophthalmic                            Active                            0.05 % Ophthalmic

 Twice a day                            [facility]                    

 

                    Sotalol HCl                            1 tablet                            Orally   

                          Active                            80 MG Orally every 12 hrs 

                           [facility]                    

 

                    Carvedilol                            1 tablet with food                            

Orally                            Active                            6.25 MG Orally Twice

 a day                            [facility]                    

 

                    Lipitor                            1 tablet                            Orally       

                          Active                            10 MG Orally Once a day       

                          [facility]                    

 

                    Lexapro                            1 tablet                            Orally       

                          Active                            10 mg Orally Once a day       

                          [facility]                    

 

                    Xarelto                            1 tablet with food                            Orally

                            Active                            20 MG Orally Once a day

                            [facility]                    

 

                    Restasis                            1  into affected eye                            

Ophthalmic                            Active                            0.05 % Ophthalmic

 Twice a day                            [facility]                    

 

                    Lexapro                            1 tablet                            Orally       

                          Active                            30 mg Orally Once a day       

                     [facility]                    

 

                    Omeprazole                            1 capsule                            Orally   

                          Active                            20 MG Orally Once a day   

                          [facility]                    

 

                    Multi For Him 50+                            Unknown                            Orally

                            Active                            Orally                 

                    [facility]                    

 

                    Carvedilol                            1 tablet with food                            

Orally                            Active                            6.25 MG Orally Twice

 a day                            [facility]                    

 

                    Lexapro                            1 tablet                            Orally       

                          Active                            10 mg Orally Once a day       

                     [facility]                    

 

                    Zoloft                            1 tablet                            Orally        

                          Active                            50 MG Orally Once a day        

                    [facility]                    

 

                    Lipitor                            1 tablet                            Orally       

                          Active                            10 MG Orally Once a day       

                     [facility]                    

 

                    Zoloft                            1 tablet                            Orally        

                          Active                            25 MG Orally Once a day        

                    [facility]                    



                                                                                
                                                                                
                                                                                
                                                                                
                                                                                
                                                                                
                                                                                
                                                                                
                                                                                
                                                                                
                                    



Allergies, Adverse Reactions, Alerts

                    





                    Substance                            Category                            Reaction   

                          Severity                            Reaction type           

                          Status                            Date Reported                     

                          Comments                            Source                    

 

                    N.K.D.A.                            Adverse Reaction                            Info

 Not Available                                                        Adverse Reaction

                            Active                            2018             

                                                      [facility]     

               

 

                          No Known Medication Allergies                            Assertion                

                                                                            Drug allergy       

                                                                                       

                                         [facility]                    

 

                    NKFA                            Assertion                                           

                                                Food allergy                            Active

                                                                                    [facility]                    



                                                                        



Immunizations

        





                                        No Data Provided for This Section



                                    



Results







                    Order Name                            Results                            Value      

                          Reference Range                            Date                

                          Interpretation                            Comments                       

                                        Source                    

 

                CHEM PANEL                            B/C Ratio       24                            6 - 25

                            2018                                               

                                                      [facility]                    

 

                CHEM PANEL                            Globulin        3.5                            2.7 -

 4.2                            2018                                           

                                                      [facility]                    

 

                CHEM PANEL                            AGAP            10.2                            10.0 - 20.0

                            2018                                               

                                                      [facility]                    

 

                CHEM PANEL                            A/G Ratio       1.0                            0.7 

- 1.6                            2018                                          

                                                      [facility]                    

 

                CHEM PANEL                            eGFR            88                                      

                    2018                                                        Result

 Comment: The eGFR is calculated using the CKD-EPI formula. In most young, 
healthy individuals the eGFR will be >90 mL/min/1.73m2. The eGFR declines with 
age. An eGFR of 60-89 may be normal in some populations, particularly the 
elderly, for whom the CKD-EPI formula has not been extensively validated. Use of
 the eGFR is not recommended in the following populations:<br/><br/>Individuals 
with unstable creatinine concentrations, including pregnant patients and those 
with serious co-morbid conditions.<br/><br/>Patients with extremes in muscle 
mass or diet. <br/><br/>The data above are obtained from the National Kid[employer] 
Disease Education Program (NKDEP) which additionally recommends that when the 
eGFR is used in patients with extremes of body mass index for purposes of drug 
dosing, the eGFR should be multiplied by the estimated BMI.                     
                                        [facility]                    

 

                CHEM PANEL                            Bili Total      0.7                            0.2

 - 1.3                            2018                                         

                                                       [facility]                    

 

                CHEM PANEL                            ALT             44                            0 - 65     

                          2018                                                    

                                                      [facility]                    

 

                CHEM PANEL                            Albumin Lvl     3.5                            3.5

 - 5.0                            2018                                         

                                                       [facility]                    

 

                CHEM PANEL                            AST             30                            0 - 37     

                          2018                                                    

                                                       [facility]                    

 

                CHEM PANEL                            Alk Phos        68                            39 - 136

                            2018                                               

                                                      [facility]                    

 

                CHEM PANEL                            Calcium Lvl     8.5                            8.5

 - 10.5                            2018                                        

                                                      [facility]                    

 

                CHEM PANEL                            Total Protein    7.0                            

6.4 - 8.4                            2018                                      

                                                       [facility]                    

 

                CHEM PANEL                            CO2             28                            24 - 32    

                          2018                                                   

                                                      [facility]                    

 

                CHEM PANEL                            Chloride Lvl    109                            95

 - 109                            2018                                         

                                                      [facility]                    

 

                CHEM PANEL                            BUN             20                            7 - 22     

                          2018                                                    

                                                      [facility]                    

 

                    CHEM PANEL                            Creatinine Lvl      0.85                        

                    0.50 - 1.40                            2018                                  

                                                      [facility]                    

 

                CHEM PANEL                            Glucose Lvl     74                            70 

- 99                            2018                                           

                                                      [facility]                    

 

                CHEM PANEL                            Potassium Lvl    4.2                            

3.5 - 5.1                            2018                                      

                                                       [facility]                    

 

                CHEM PANEL                            Sodium Lvl      143                            135

 - 145                            2018                                         

                                                      [facility]                    

 

                HEMATOLOGY                            Platelet        165                            133 -

 450                            2018                                           

                                                      [facility]                    

 

                HEMATOLOGY                            RBC             4.28                            4.70 - 6.10

                            2018                                               

                                                      [facility]                    

 

                HEMATOLOGY                            WBC             6.1                            3.7 - 10.4

                            2018                                               

                                                      [facility]                    

 

                HEMATOLOGY                            Hgb             13.9                            14.0 - 18.0

                            2018                                               

                                                      [facility]                    

 

                HEMATOLOGY                            MCV             95.9                            80.0 - 94.0

                            2018                                               

                                                      [facility]                    

 

                HEMATOLOGY                            Hct             41.0                            42.0 - 54.0

                            2018                                               

                                                      [facility]                            MCHC            33.8                            32.0 - 36.0

                            2018                                               

                                                      [facility]                            MCH             32.4                            27.0 - 31.0

                            2018                                               

                                                      [facility]                    

 

                HEMATOLOGY                            RDW             13.6                            11.5 - 14.5

                            2018                                               

                                                      [facility]                            MPV             8.4                            7.4 - 10.4

                            2018                                               

                                                      [facility]                            Lymphocytes     32.7                            20.0

 - 40.0                            2018                                        

                                                      [facility]                    

 

                HEMATOLOGY                            Segs            52.3                            45.0 - 75.0

                            2018                                               

                                                      [facility]                    

 

                HEMATOLOGY                            Monocytes       10.2                            2.0

 - 12.0                            2018                                        

                                                      [facility]                    

 

                HEMATOLOGY                            Basophils       0.9                            0.0 

- 1.0                            2018                                          

                                                      [facility]                    

 

                HEMATOLOGY                            Neutrophils #    3.2                            

1.5 - 8.1                            2018                                      

                                                      [facility]                    

 

                HEMATOLOGY                            Lymphocytes #    2.0                            

1.0 - 5.5                            2018                                      

                                                      [facility]                    

 

                HEMATOLOGY                            Monocytes #     0.6                            0.0

 - 0.8                            2018                                         

                                                      [facility]                    

 

                HEMATOLOGY                            Eosinophils     3.9                            0.0

 - 4.0                            2018                                         

                                                      [facility]                    

 

                HEMATOLOGY                            Basophils #     0.1                            0.0

 - 0.2                            2018                                         

                                                      [facility]                    

 

                HEMATOLOGY                            Eosinophils #    0.2                            

0.0 - 0.5                            2018                                      

                                                      [facility]                    



                                                                                
                                                                                
                                                                                
                                                                                
                                           



Pathology Reports







                                        No Data Provided for This Section                    



                            



Diagnostic Reports

            





                                        No Data Provided for This Section                    



                                                            



Consultation Notes

                    





                                        No Data Provided for This Section                    



                                                            



Discharge Summaries

                    





                                        No Data Provided for This Section                    



                                                            



History and Physicals

                    





                                        No Data Provided for This Section                    



                                                                



Vital Signs

                     





                    Vital Sign                            Value                            Date         

                          Comments                            Source                    

 

                    BMI Calculated                            28.27                             2019

                                                         [facility]             

       

 

                    Height                            180.34 cm                            2019   

                                                       [facility]                

    

 

                    Weight                            91.932                             2019     

                                                       [facility]                  

  

 

                    Temperature Oral (F)                            97.5 F                            2019

                                                         [facility]             

       

 

                    Heart Rate                            84                             2019     

                                                       [facility]                  

  

 

                    Systolic (mm Hg)                            102                             2019

                                                         [facility]             

       

 

                    Diastolic (mm Hg)                            66                             2019

                                                         [facility]             

       

 

                    Systolic (mm Hg)                            102                             2018

                                                        [facility]                 

   

 

                    Diastolic (mm Hg)                            57                             2018

                                                        [facility]                 

   

 

                    Systolic (mm Hg)                            94                             2018

                                                        [facility]                 

   

 

                    Diastolic (mm Hg)                            70                             2018

                                                        [facility]                 

   

 

                    Systolic (mm Hg)                            93                             2018

                                                        [facility]                 

   

 

                    Diastolic (mm Hg)                            66                             2018

                                                        [facility]                 

   

 

                    Respitory Rate                            16                             2018 

                                                       [facility]                  

  

 

                    Respitory Rate                            16                             2018 

                                                       [facility]                  

  

 

                    Respitory Rate                            16                             2018 

                                                       [facility]                  

  

 

                    Temperature Oral (F)                            97.7 F                            2018

                                                        [facility]                 

   

 

                    Heart Rate                            58                             2018     

                                                      [facility]                    

 

                    Height                            180.34 cm                            2018   

                                                      [facility]                    



 

                    Weight                            90.909                             2018     

                                                      [facility]                    

 

                    BMI Calculated                            27.95                             2018

                                                        [facility]                 

   

 

                    Weight                            90.909                             2018     

                                                       [facility]                  

  

 

                    BMI Calculated                            27.95                             2018

                                                         [facility]             

       

 

                    Heart Rate                            76                             2018     

                                                      [facility]                  

  

 

                    Height                            180.34 cm                            2018   

                                                      [facility]                

    

 

                    Systolic (mm Hg)                            118                             2018

                                                        [facility]             

       

 

                    Diastolic (mm Hg)                            83                             2018

                                                         [facility]             

       

 

                    BMI Calculated                            28.87                             2018

                                                        [facility]             

       

 

                    Weight                            93.892                             2018     

                                                      [facility]                  

  

 

                    Height                            180.34 cm                            2018   

                                                       [facility]                

    

 

                    Heart Rate                            74                             2018     

                                                       [facility]                  

  

 

                    Temperature Oral (F)                            97.4 F                            2018

                                                        [facility]             

       

 

                    Systolic (mm Hg)                            110                             2018

                                                        [facility]             

       

 

                    Diastolic (mm Hg)                            75                             2018

                                                         [facility]             

       

 

                    Weight                            206.6                             2018      

                                                      [facility]

                    

 

                    Height                            71                             2018         

                                                      [facility] 

                   

 

                    Heart Rate                            55                             2018     

                                                      [facility]

                    

 

                    Diastolic (mm Hg)                            68                             2018

                                                        [facility]                    

 

                    Systolic (mm Hg)                            118                             2018

                                                        [facility]                    

 

                    Weight                            294                             2016        

                                                      [facility]

                    

 

                    Height                            71                             2016         

                                                      [facility] 

                   

 

                    Heart Rate                            82                             2016     

                                                      [facility]

                    

 

                    Diastolic (mm Hg)                            64                             2016

                                                        [facility]                    

 

                    Systolic (mm Hg)                            108                             2016

                                                        [facility]                    

 

                    Weight                            289                             11/10/2015        

                                                      [facility]

                    

 

                    Height                            71                             11/10/2015         

                                                      [facility] 

                   

 

                    Diastolic (mm Hg)                            70                             11/10/2015

                                                        [facility]                    

 

                    Systolic (mm Hg)                            108                             11/10/2015

                                                        [facility]                    

 

                    Weight                            278                             2015        

                                                      [facility]

                    

 

                    Height                            71                             2015         

                                                      [facility] 

                   

 

                    Heart Rate                            84                             2015     

                                                      [facility]

                    

 

                    Diastolic (mm Hg)                            74                             2015

                                                        [facility]                    

 

                    Systolic (mm Hg)                            116                             2015

                                                        [facility]                    

 

                    Weight                            285                             2014        

                                                      [facility]

                    

 

                    Height                            71                             2014         

                                                      [facility] 

                   

 

                    Heart Rate                            60                             2014     

                                                      [facility]

                    

 

                    Diastolic (mm Hg)                            84                             2014

                                                        [facility]                    

 

                    Systolic (mm Hg)                            110                             2014

                                                        [facility]                    

 

                    Weight                            281                             2014        

                                                      [facility]

                    

 

                    Height                            71                             2014         

                                                      [facility] 

                   

 

                    Temperature Oral (F)                            97.7 F                            2014

                                                        [facility]                    

 

                    Heart Rate                            60                             2014     

                                                      [facility]

                    

 

                    Diastolic (mm Hg)                            78                             2014

                                                        [facility]                    

 

                    Systolic (mm Hg)                            110                             2014

                                                        [facility]                    



                                                                                
                                                                                
                                                                                
                                                                                
                                                                                
                                                                                
                                                                                
                                                                                
                                                                                
                                                                                
                                                                                
                                                                                
                                                                                
                                                        



Encounters

                    





                    Location                            Location Details                            Encounter

 Type                            Encounter Number                            Reason For

 Visit                            Attending Provider                            ADM Date

                            DC Date                            Status                

                                        Source                    

 

                                        [facility]                       

                                                Physical FBW                            [id number]

                                                                                      2014                                               

                                        [facility]                    

 

                                        [facility]                       

                                                Physical FBW                            [id number]

                                                                                      2014                                               

                                        [facility]                    

 

                                        [facility]                       

                                                Physical FBW                            [id number]

                                                                                      2014                                               

                                        [facility]                    

 

                                        [facility]                       

                                                Physical FBW                            [id number]

                                                                                      2014                                               

                                        [facility]                    

 

                                        [facility]                       

                                                Physical FBW                            [id number]

                                                                                      2014                                               

                                        [facility]                    

 

                                        [facility]                       

                                                Physical FBW                            [id number]

                                                                                      2014                                               

                                        [facility]                    

 

                                        [facility]                       

                                                F/U BW                            [id number]

                                                                                      2014                                               

                                        [facility]                    

 

                                        [facility]                       

                                                F/U                             [id number]

                                                                                      2014                                               

                                        [facility]                    

 

                                        [facility]                       

                                                F/U                             [id number]

                                                                                      2014                                               

                                        [facility]                    

 

                                        [facility]                       

                                                F/U                             [id number]

                                                                                      2014                                               

                                        [facility]                    

 

                                        [facility]                       

                                                F/U                             [id number]

                                                                                      2014                                               

                                        [facility] [facility]                    

 

                                        [facility]                       

                                                F/U                             [id number]

                                                                                      2014                                               

                                        [facility]                    

 

                                        [facility]                       

                                                Night 2 Sleep Study                            [id number]

                                                                                      2015                                               

                                        [facility]                    

 

                                        [facility]                       

                                                Night 2 Sleep Study                            [id number]

                                                                                      2015                                               

                                        [facility]                    

 

                                        [facility]                       

                                                Night 2 Sleep Study                            [id number]

                                                                                      2015                                               

                                        [facility]                    

 

                                        [facility]                       

                                                Night 2 Sleep Study                            [id number]

                                                                                      2015                                               

                                        [facility]                    

 

                                        [facility]                       

                                                CPAP FOLLOW UP                            [id number]

                                                                                      2015                                               

                                        [facility]                    

 

                                        [facility]                       

                                                CPAP FOLLOW UP                            [id number]

                                                                                      2015                                               

                                        [facility]                    

 

                                        [facility]                       

                                                CPAP FOLLOW UP                            [id number]

                                                                                      2015                                               

                                        [facility]                    

 

                                        [facility]                       

                                                CPAP FOLLOW UP                            [id number]

                                                                                      2015                                               

                                        [facility]                    

 

                                        [facility]                       

                                                CUB Notes for CPAP machine                            

[id number]                                               

                                                  2015

                                                        [facility]                    

 

                                        [facility]                       

                                                CUB Notes for CPAP machine                            

[id number]                                               

                                                  2015

                                                        [facility]                    

 

                                        [facility]                       

                                                CUB Notes for CPAP machine                            

[id number]                                               

                                                  2015

                                                        [facility]                    

 

                                        [facility]                       

                                                CUB Notes for CPAP machine                            

[id number]                                               

                                                  2015

                                                        [facility]                    

 

                                        [facility]                       

                                                New Appointment Request                            [id number]

                                                                                      2015                                               

                                        [facility]                    

 

                                        [facility]                       

                                                New Appointment Request                            [id number]

                                                                                      2015                                               

                                        [facility]                    

 

                                        [facility]                       

                                                New Appointment Request                            [id number]

                                                                                      2015                                               

                                        [facility]                    

 

                                        [facility]                       

                                                req bone density                            [id number]

                                                                                      2015                                               

                                        [facility]                    

 

                                        [facility]                       

                                                req bone density                            [id number]

                                                                                      2015                                               

                                        [facility]                    

 

                                        [facility]                       

                                                req bone density                            [id number]

                                                                                      2015                                               

                                        [facility]                    

 

                                        [facility]                       

                                                FOLLOW UP                            [id number]

                                                                                      10/12/2015

                            10/12/2015                                               

                                        [facility]                    

 

                                        [facility]                       

                                                FOLLOW UP                            [id number]

                                                                                      10/12/2015

                            10/12/2015                                               

                                        [facility]                    

 

                                        [facility]                       

                                                FOLLOW UP                            [id number]

                                                                                      10/12/2015

                            10/12/2015                                               

                                        [facility]                    

 

                                        [facility]                       

                                                New Appointment Request                            [id number]

                                                                                      10/13/2015

                            10/13/2015                                               

                                        [facility]                    

 

                                        [facility]                       

                                                New Appointment Request                            [id number]

                                                                                      10/13/2015

                            10/13/2015                                               

                                        [facility]                    

 

                                        [facility]                       

                                                New Appointment Request                            [id number]

                                                                                      10/13/2015

                            10/13/2015                                               

                                        [facility]                    

 

                                        [facility]                       

                                                physical exam                            [id number]

                                                                                      11/10/2015

                            11/10/2015                                               

                                        [facility]                    

 

                                        [facility]                       

                                                physical exam                            [id number]

                                                                                      11/10/2015

                            11/10/2015                                               

                                        [facility]                    

 

                                        [facility]                       

                                                physical exam                            [id number]

                                                                                      11/10/2015

                            11/10/2015                                               

                                        [facility]                    

 

                                        [facility]                       

                                                REFERRAL                            [id number]

                                                                                      2016                                               

                                        [facility]                    

 

                                        [facility]                       

                                                REFERRAL                            [id number]

                                                                                      2016                                               

                                        [facility]                    

 

                                        [facility]                       

                                                New Refill Request                            [id number]

                                                                                      2017                                               

                                        [facility]                    

 

                                                                            Outpatient                  

                    [id number]                                                        [facility] 

                            2018                                               

                          Active                            [facility] [place]                                                    

                    Outpatient                            [id number]                                 

                          [facility]                             2018                                                         Medical

 Group                    

 

                                                                            Outpatient                  

                    [id number]                                                        MINDY KRYSTAL

                             2018                                              

                          Active                            [facility]                                                   

                    Outpatient                            [id number]                                

                          Mindy Krystal                             2018                                                         Medical

 Group                    

 

                                                                            Outpatient                  

                    [id number]                                                        NURSE VISIT

                             2018                                              

                          Active                            [facility]                                                   

                    Outpatient                            [id number]                                

                          Mindy Krystal                             2018                                                         Medical

 Group                    

 

                                                                            Outpatient                  

                    [id number]                                                        [facility] 

                            2018                                               

                          Active                            [facility]                                               

                          Day Surgery                            [id number]                         

                                                [facility]                             2018                                                        [facility] [place]                                                    

                    Outpatient                            [id number]                                 

                          [facility]                             2018                                                         Medical

 Group                    

 

                                                                            Outpatient                  

                    [id number]                                                        MINDY KRYSTAL

                             2019                                              

                          Active                            [facility]                                                   

                          Ambulatory Pre-Reg                            [id number]                      

                                                Mindy Schuster                             2019                                                  

                                        [facility]                    

 

                                                                            Outpatient                  

                    [id number]                                                        KULDEEP MCKENZIE 

                            2019                                               

                          Active                            [facility]                    

 

                          [facility]                                                    

                    Outpatient                            [id number]                                 

                          Kuldeep Mckenzie                             2019                                                        [facility]                    



                                                                                
                                                                                
                                                                                
                                                                                
                                                                                
                                                                                
                                                                                
                                                                                
                                                                                



Procedures

                    





                    Procedure                            Code                            Date           

                          Perfomer                            Comments                        

                                        Source                    

 

                          Laparoscopic repair of inguinal hernia                            [id number]        

                    2018                                                         

                                        [facility]                    

 

                                        Measurement of post-voiding residual urine and/or bladder capacity by ultrasound,

 non-imaging                            [id number]                            2018  

                                                                                  [facility]                    

 

                                        Complex uroflowmetry (eg, calibrated electronic equipment)                      

                    [id number]                            2018                                      

                                                      [facility]                    

 

                    Gastric bypass                            [id number]                            2009

                                                                                    [facility]                    

 

                    Operation                            [id number]                            2008

                                                                                    [facility]                    

 

                          Implantation of electronic stimulator of spine                            [id number]

                            2007                                               

                                                      [facility]                   

 

 

                                        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                    

                    [id number]                            2001                                

                                                      [facility]    

                

 

                          Esophagogastroduodenoscopy                            [id number]                    

                                                                                                   

                                        [facility]                    



                                                                                
                                                                                
            



Assessment and Plan

                    





                                        No Data Provided for This Section                    



                                     



Plan of Care







                                        No Data Provided for This Section                    



                                                                



Social History

                    





                    Social History                            Date                            Source    

                

 

                                        Social History TypeResponse

Substance Abuse

Use: None.

Sexual

Sexually active: No.  Testicular Self Exam No.

Employment/School

Status: Retired.

Alcohol

Never

Smoking Status

Current every day smoker; Type: Cigarettes; Concerns about tobacco use in 
household: Yes; Lives with someone who smokes; Cigarette Smoking Last 365 Days 
Yes; Reg Smoking Cessation Counseling No

entered on: 2018                            [facility]   

                 

 

                                        Social History TypeResponse

Substance Abuse

Use: None.

Sexual

Sexually active: No.  Testicular Self Exam No.

Employment/School

Status: Retired.

Alcohol

Never

Smoking Status

Current every day smoker; Type: Cigarettes; Concerns about tobacco use in 
household: Yes; Lives with someone who smokes; Cigarette Smoking Last 365 Days 
Yes; Reg Smoking Cessation Counseling No

entered on: 2018                            [facility]       

             

 

                                        Social History ElementQualifiersDate Reported

Last Colonoscopy:

                                        .  2009

children

                                        .  5

2016

Tobacco Use:

                                        .  Are you a: never smoker

2016

Use of recreational / street drugs?

                                        .  Answer: No

2016

Ethnicity

                                        .  Status , Is english your primary language? Yes

2016

Do you have pets?

                                        .  Status: Yes, Type: dog(s)

2016

Marital Status:

.  Misty

2016

Caffeine intake?

                                        .  Status: Yes, What type: Coffee, 1 cup a day

2016

Do you exercise?

                                        .  Answer: Yes

2016

Do you drink alcohol?

                                        .  Status: No

2016

Occupation:

                                        .  , retired

2016                            [facility]                    



                                                                                
                        



Family History

                    





                    Value                            Date                            Source             

       

 

                                        QualifierDescriptionCommentDate Reported

Maternal Grandmother



Comment not available

2016

Paternal Grandmother



Comment not available

2016

Siblings

Comment not available

2016

Maternal Grandfather



heart disease

2016

Children

alive

Comment not available

2016

Father



heart disease

2016

Paternal Grandfather



heart disease

2016

Mother



Comment not available

2016

Other:

Comment not available

2016

                            02/10/2016                            [facility]                    

 

                                        QualifierDescriptionCommentDate Reported

Maternal Grandmother



Comment not available

Nov 10, 2015

Paternal Grandmother



Comment not available

Nov 10, 2015

Siblings

Comment not available

Nov 10, 2015

Maternal Grandfather



heart disease

Nov 10, 2015

Children

alive

Comment not available

Nov 10, 2015

Father



heart disease

Nov 10, 2015

Paternal Grandfather



heart disease

Nov 10, 2015

Mother



Comment not available

Nov 10, 2015

Other:

Comment not available

Nov 10, 2015

                            2015                            [facility]                    

 

                                        QualifierDescriptionCommentDate Reported

Maternal Grandmother



Comment not available

2015

Paternal Grandmother



Comment not available

2015

Children

alive

Comment not available

2015

Maternal Grandfather



heart disease

2015

Father



heart disease

2015

Mother



Comment not available

2015

Paternal Grandfather



heart disease

2015                            [facility]                    

 

                                        QualifierDescriptionCommentDate Reported

Maternal Grandmother



Comment not available

2014

Paternal Grandmother



Comment not available

2014

Father



heart disease

2014

Maternal Grandfather



heart disease

2014

Mother



Comment not available

2014

Paternal Grandfather



heart disease

2014                            [facility]                    

 

                                        QualifierDescriptionCommentDate Reported

Maternal Grandmother



Comment not available

2014

Paternal Grandmother



Comment not available

2014

Father



heart disease

2014

Maternal Grandfather



heart disease

2014

Mother



Comment not available

2014

Paternal Grandfather



heart disease

2014                            [facility]                    



                                                                                
                                                    



Ad[employer] Directives

                    





                                        No Data Provided for This Section                    



                                                            



Functional Status

                    





                                        No Data Provided for This Section MTM referral from: [facility] visit (referral by provider)    MTM referral outreach attempt #2 on June 14, 2021 at 2:16 PM      Outcome: Patient is not interested at this time because her insurance does not cover MTM, will route to MTM Pharmacist/Provider as an FYI. Thank you for the referral.     Nilda Waddell, MTM Coordinator